# Patient Record
Sex: MALE | Race: WHITE | NOT HISPANIC OR LATINO | Employment: FULL TIME | URBAN - METROPOLITAN AREA
[De-identification: names, ages, dates, MRNs, and addresses within clinical notes are randomized per-mention and may not be internally consistent; named-entity substitution may affect disease eponyms.]

---

## 2021-05-24 RX ORDER — FLUTICASONE PROPIONATE 50 MCG
2 SPRAY, SUSPENSION (ML) NASAL DAILY
COMMUNITY
Start: 2015-05-04 | End: 2021-05-25 | Stop reason: ALTCHOICE

## 2021-05-24 RX ORDER — ALBUTEROL SULFATE 90 UG/1
AEROSOL, METERED RESPIRATORY (INHALATION)
COMMUNITY
Start: 2014-12-06

## 2021-05-25 ENCOUNTER — OFFICE VISIT (OUTPATIENT)
Dept: FAMILY MEDICINE CLINIC | Facility: CLINIC | Age: 36
End: 2021-05-25
Payer: COMMERCIAL

## 2021-05-25 VITALS
BODY MASS INDEX: 26.95 KG/M2 | HEIGHT: 72 IN | HEART RATE: 64 BPM | DIASTOLIC BLOOD PRESSURE: 64 MMHG | RESPIRATION RATE: 16 BRPM | WEIGHT: 199 LBS | OXYGEN SATURATION: 99 % | SYSTOLIC BLOOD PRESSURE: 122 MMHG

## 2021-05-25 DIAGNOSIS — Z13.6 SCREENING FOR CARDIOVASCULAR, RESPIRATORY, AND GENITOURINARY DISEASES: ICD-10-CM

## 2021-05-25 DIAGNOSIS — Z13.89 SCREENING FOR CARDIOVASCULAR, RESPIRATORY, AND GENITOURINARY DISEASES: ICD-10-CM

## 2021-05-25 DIAGNOSIS — E53.8 B12 DEFICIENCY: ICD-10-CM

## 2021-05-25 DIAGNOSIS — Z00.00 WELL ADULT EXAM: Primary | ICD-10-CM

## 2021-05-25 DIAGNOSIS — E55.9 VITAMIN D DEFICIENCY: ICD-10-CM

## 2021-05-25 DIAGNOSIS — J45.20 MILD INTERMITTENT ASTHMA WITHOUT COMPLICATION: ICD-10-CM

## 2021-05-25 DIAGNOSIS — Z83.2 FAMILY HISTORY OF AUTOIMMUNE DISORDER: ICD-10-CM

## 2021-05-25 DIAGNOSIS — Z11.4 SCREENING FOR HIV (HUMAN IMMUNODEFICIENCY VIRUS): ICD-10-CM

## 2021-05-25 DIAGNOSIS — Z13.29 SCREENING FOR THYROID DISORDER: ICD-10-CM

## 2021-05-25 DIAGNOSIS — Z13.83 SCREENING FOR CARDIOVASCULAR, RESPIRATORY, AND GENITOURINARY DISEASES: ICD-10-CM

## 2021-05-25 DIAGNOSIS — Z79.899 OTHER LONG TERM (CURRENT) DRUG THERAPY: ICD-10-CM

## 2021-05-25 PROCEDURE — 3008F BODY MASS INDEX DOCD: CPT | Performed by: FAMILY MEDICINE

## 2021-05-25 PROCEDURE — 3725F SCREEN DEPRESSION PERFORMED: CPT | Performed by: FAMILY MEDICINE

## 2021-05-25 PROCEDURE — 1036F TOBACCO NON-USER: CPT | Performed by: FAMILY MEDICINE

## 2021-05-25 PROCEDURE — 99395 PREV VISIT EST AGE 18-39: CPT | Performed by: FAMILY MEDICINE

## 2021-05-25 NOTE — PROGRESS NOTES
Assessment/Plan:  Healthy male   Brother with crohns and AS   He feels healthy     1  Well adult exam  -     Lipid panel; Future; Expected date: 05/25/2021  -     Comprehensive metabolic panel; Future; Expected date: 05/25/2021  -     CBC; Future; Expected date: 05/25/2021  -     UA w Reflex to Microscopic w Reflex to Culture; Future; Expected date: 05/25/2021    2  Vitamin D deficiency  -     Vitamin D 25 hydroxy; Future; Expected date: 05/25/2021    3  B12 deficiency  -     Vitamin B12; Future; Expected date: 05/25/2021    4  Other long term (current) drug therapy  -     Hemoglobin A1C; Future; Expected date: 05/25/2021    5  Screening for cardiovascular, respiratory, and genitourinary diseases  -     UA w Reflex to Microscopic w Reflex to Culture; Future; Expected date: 05/25/2021    6  Screening for thyroid disorder  -     TSH, 3rd generation with Free T4 reflex; Future; Expected date: 05/25/2021    7  Screening for HIV (human immunodeficiency virus)  -     HIV 1/2 Antigen/Antibody (4th Generation) w Reflex [facility]; Future    8  Mild intermittent asthma without complication    9  Family history of autoimmune disorder  -     NIRMAL Screen w/ Reflex to Titer/Pattern; Future    10  BMI 26 0-26 9,adult         Subjective:      Patient ID: Davis Ramires is a 37 y o  male  HPI   Here for yearly physical       The following portions of the patient's history were reviewed and updated as appropriate: allergies, current medications, past family history, past medical history, past social history, past surgical history and problem list     Review of Systems   Constitutional: Negative for activity change, appetite change and fever  HENT: Negative for congestion, nosebleeds and trouble swallowing  Eyes: Negative for itching  Respiratory: Negative for cough and chest tightness  Cardiovascular: Negative for chest pain and palpitations     Gastrointestinal: Negative for abdominal pain, constipation, diarrhea and nausea  Endocrine: Negative for cold intolerance  Genitourinary: Negative for frequency  Musculoskeletal: Negative for gait problem and joint swelling  Skin: Negative for rash  Allergic/Immunologic: Negative for immunocompromised state  Neurological: Negative for dizziness, tremors, seizures, syncope and headaches  Psychiatric/Behavioral: Negative for hallucinations and suicidal ideas  Objective:      /64 (BP Location: Right arm, Patient Position: Sitting, Cuff Size: Large)   Pulse 64   Resp 16   Ht 6' (1 829 m)   Wt 90 3 kg (199 lb)   SpO2 99%   BMI 26 99 kg/m²     No visits with results within 2 Week(s) from this visit  Latest known visit with results is:   Lab Conversion - Encounter on 07/14/2014   Component Date Value    Chlamydia Trachomatis By* 07/10/2014 NOT DETECTED     GC BY [facility]  METHOD (HISTO* 07/10/2014 NOT DETECTED           Physical Exam  Vitals signs and nursing note reviewed  Constitutional:       General: He is not in acute distress  Appearance: He is well-developed  HENT:      Head: Normocephalic and atraumatic  Neck:      Musculoskeletal: Normal range of motion and neck supple  Cardiovascular:      Rate and Rhythm: Normal rate and regular rhythm  Heart sounds: Normal heart sounds  No murmur  Pulmonary:      Effort: Pulmonary effort is normal       Breath sounds: Normal breath sounds  No wheezing or rales  Abdominal:      General: Bowel sounds are normal  There is no distension  Palpations: Abdomen is soft  Tenderness: There is no abdominal tenderness  There is no guarding or rebound  Musculoskeletal: Normal range of motion  General: No tenderness  Lymphadenopathy:      Cervical: No cervical adenopathy  Skin:     General: Skin is warm and dry  Capillary Refill: Capillary refill takes less than 2 seconds  Findings: No rash     Neurological:      Mental Status: He is alert and oriented to person, place, and time       Cranial Nerves: No cranial nerve deficit  Sensory: No sensory deficit  Motor: No abnormal muscle tone  Psychiatric:         Behavior: Behavior normal          Thought Content: Thought content normal          Judgment: Judgment normal          BMI Counseling: Body mass index is 26 99 kg/m²  The BMI is above normal  Nutrition recommendations include decreasing portion sizes, encouraging healthy choices of fruits and vegetables and limiting drinks that contain sugar  Exercise recommendations include moderate physical activity 150 minutes/week  No pharmacotherapy was ordered             Vickie Hyatt MD  [place] [id number]

## 2021-09-20 ENCOUNTER — APPOINTMENT (OUTPATIENT)
Dept: LAB | Facility: CLINIC | Age: 36
End: 2021-09-20
Payer: COMMERCIAL

## 2021-09-20 DIAGNOSIS — Z13.89 SCREENING FOR CARDIOVASCULAR, RESPIRATORY, AND GENITOURINARY DISEASES: ICD-10-CM

## 2021-09-20 DIAGNOSIS — E55.9 VITAMIN D DEFICIENCY: ICD-10-CM

## 2021-09-20 DIAGNOSIS — Z83.2 FAMILY HISTORY OF AUTOIMMUNE DISORDER: ICD-10-CM

## 2021-09-20 DIAGNOSIS — Z13.83 SCREENING FOR CARDIOVASCULAR, RESPIRATORY, AND GENITOURINARY DISEASES: ICD-10-CM

## 2021-09-20 DIAGNOSIS — Z11.4 SCREENING FOR HIV (HUMAN IMMUNODEFICIENCY VIRUS): ICD-10-CM

## 2021-09-20 DIAGNOSIS — Z13.6 SCREENING FOR CARDIOVASCULAR, RESPIRATORY, AND GENITOURINARY DISEASES: ICD-10-CM

## 2021-09-20 DIAGNOSIS — Z79.899 OTHER LONG TERM (CURRENT) DRUG THERAPY: ICD-10-CM

## 2021-09-20 DIAGNOSIS — Z13.29 SCREENING FOR THYROID DISORDER: ICD-10-CM

## 2021-09-20 DIAGNOSIS — E53.8 B12 DEFICIENCY: ICD-10-CM

## 2021-09-20 DIAGNOSIS — Z00.00 WELL ADULT EXAM: ICD-10-CM

## 2021-09-20 LAB
25(OH)D3 SERPL-MCNC: 33 NG/ML (ref 30–100)
ALBUMIN SERPL BCP-MCNC: 4.7 G/DL (ref 3.5–5)
ALP SERPL-CCNC: 45 U/L (ref 46–116)
ALT SERPL W P-5'-P-CCNC: 25 U/L (ref 12–78)
ANION GAP SERPL CALCULATED.3IONS-SCNC: 11 MMOL/L (ref 4–13)
AST SERPL W P-5'-P-CCNC: 17 U/L (ref 5–45)
BILIRUB SERPL-MCNC: 0.55 MG/DL (ref 0.2–1)
BILIRUB UR QL STRIP: NEGATIVE
BUN SERPL-MCNC: 17 MG/DL (ref 5–25)
CALCIUM SERPL-MCNC: 8.9 MG/DL (ref 8.3–10.1)
CHLORIDE SERPL-SCNC: 104 MMOL/L (ref 100–108)
CHOLEST SERPL-MCNC: 180 MG/DL (ref 50–200)
CLARITY UR: CLEAR
CO2 SERPL-SCNC: 25 MMOL/L (ref 21–32)
COLOR UR: YELLOW
CREAT SERPL-MCNC: 1.03 MG/DL (ref 0.6–1.3)
ERYTHROCYTE [DISTWIDTH] IN BLOOD BY AUTOMATED COUNT: 12.3 % (ref 11.6–15.1)
EST. AVERAGE GLUCOSE BLD GHB EST-MCNC: 94 MG/DL
GFR SERPL CREATININE-BSD FRML MDRD: 95 ML/MIN/1.73SQ M
GLUCOSE P FAST SERPL-MCNC: 88 MG/DL (ref 65–99)
GLUCOSE UR STRIP-MCNC: NEGATIVE MG/DL
HBA1C MFR BLD: 4.9 %
HCT VFR BLD AUTO: 43.9 % (ref 36.5–49.3)
HDLC SERPL-MCNC: 58 MG/DL
HGB BLD-MCNC: 14.6 G/DL (ref 12–17)
HGB UR QL STRIP.AUTO: NEGATIVE
KETONES UR STRIP-MCNC: NEGATIVE MG/DL
LDLC SERPL CALC-MCNC: 109 MG/DL (ref 0–100)
LEUKOCYTE ESTERASE UR QL STRIP: NEGATIVE
MCH RBC QN AUTO: 28.6 PG (ref 26.8–34.3)
MCHC RBC AUTO-ENTMCNC: 33.3 G/DL (ref 31.4–37.4)
MCV RBC AUTO: 86 FL (ref 82–98)
NITRITE UR QL STRIP: NEGATIVE
NONHDLC SERPL-MCNC: 122 MG/DL
PH UR STRIP.AUTO: 6 [PH]
PLATELET # BLD AUTO: 235 THOUSANDS/UL (ref 149–390)
PMV BLD AUTO: 9.5 FL (ref 8.9–12.7)
POTASSIUM SERPL-SCNC: 4.6 MMOL/L (ref 3.5–5.3)
PROT SERPL-MCNC: 7.3 G/DL (ref 6.4–8.2)
PROT UR STRIP-MCNC: NEGATIVE MG/DL
RBC # BLD AUTO: 5.11 MILLION/UL (ref 3.88–5.62)
SODIUM SERPL-SCNC: 140 MMOL/L (ref 136–145)
SP GR UR STRIP.AUTO: 1.01 (ref 1–1.03)
TRIGL SERPL-MCNC: 64 MG/DL
TSH SERPL DL<=0.05 MIU/L-ACNC: 1.65 UIU/ML (ref 0.36–3.74)
UROBILINOGEN UR QL STRIP.AUTO: 0.2 E.U./DL
VIT B12 SERPL-MCNC: 471 PG/ML (ref 100–900)
WBC # BLD AUTO: 5.28 THOUSAND/UL (ref 4.31–10.16)

## 2021-09-20 PROCEDURE — 80061 LIPID PANEL: CPT

## 2021-09-20 PROCEDURE — 85027 COMPLETE CBC AUTOMATED: CPT

## 2021-09-20 PROCEDURE — 80053 COMPREHEN METABOLIC PANEL: CPT

## 2021-09-20 PROCEDURE — 82306 VITAMIN D 25 HYDROXY: CPT

## 2021-09-20 PROCEDURE — 84443 ASSAY THYROID STIM HORMONE: CPT

## 2021-09-20 PROCEDURE — 82607 VITAMIN B-12: CPT

## 2021-09-20 PROCEDURE — 81003 URINALYSIS AUTO W/O SCOPE: CPT

## 2021-09-20 PROCEDURE — 36415 COLL VENOUS BLD VENIPUNCTURE: CPT

## 2021-09-20 PROCEDURE — 87389 HIV-1 AG W/HIV-1&-2 AB AG IA: CPT

## 2021-09-20 PROCEDURE — 86038 ANTINUCLEAR ANTIBODIES: CPT

## 2021-09-20 PROCEDURE — 83036 HEMOGLOBIN GLYCOSYLATED A1C: CPT

## 2021-09-21 ENCOUNTER — TELEPHONE (OUTPATIENT)
Dept: FAMILY MEDICINE CLINIC | Facility: CLINIC | Age: 36
End: 2021-09-21

## 2021-09-21 LAB — HIV 1+2 AB+HIV1 P24 AG SERPL QL IA: NORMAL

## 2021-09-21 NOTE — TELEPHONE ENCOUNTER
Pt called is requesting an order for an Allergist  He said the allergy medication is not working anymore

## 2021-09-22 LAB — RYE IGE QN: NEGATIVE

## 2021-12-16 PROBLEM — J30.81 ALLERGIC RHINITIS DUE TO DOGS: Status: ACTIVE | Noted: 2021-12-16

## 2021-12-16 PROBLEM — H10.13 ALLERGIC CONJUNCTIVITIS OF BOTH EYES: Status: ACTIVE | Noted: 2021-12-16

## 2021-12-16 PROBLEM — J30.1 CHRONIC SEASONAL ALLERGIC RHINITIS DUE TO POLLEN: Status: ACTIVE | Noted: 2021-12-16

## 2021-12-16 PROBLEM — J30.89 ALLERGIC RHINITIS DUE TO MOLD: Status: ACTIVE | Noted: 2021-12-16

## 2022-05-25 ENCOUNTER — OFFICE VISIT (OUTPATIENT)
Dept: FAMILY MEDICINE CLINIC | Facility: CLINIC | Age: 37
End: 2022-05-25
Payer: COMMERCIAL

## 2022-05-25 VITALS
OXYGEN SATURATION: 98 % | SYSTOLIC BLOOD PRESSURE: 126 MMHG | DIASTOLIC BLOOD PRESSURE: 74 MMHG | RESPIRATION RATE: 16 BRPM | WEIGHT: 204 LBS | HEART RATE: 100 BPM | HEIGHT: 72 IN | BODY MASS INDEX: 27.63 KG/M2

## 2022-05-25 DIAGNOSIS — Z00.00 WELL ADULT EXAM: Primary | ICD-10-CM

## 2022-05-25 DIAGNOSIS — T78.40XD ALLERGY, SUBSEQUENT ENCOUNTER: ICD-10-CM

## 2022-05-25 PROCEDURE — 3725F SCREEN DEPRESSION PERFORMED: CPT | Performed by: FAMILY MEDICINE

## 2022-05-25 PROCEDURE — 1036F TOBACCO NON-USER: CPT | Performed by: FAMILY MEDICINE

## 2022-05-25 PROCEDURE — 99395 PREV VISIT EST AGE 18-39: CPT | Performed by: FAMILY MEDICINE

## 2022-05-25 PROCEDURE — 3008F BODY MASS INDEX DOCD: CPT | Performed by: FAMILY MEDICINE

## 2022-05-25 NOTE — PROGRESS NOTES
Assessment/Plan:    1  Well adult exam    2  Allergy, subsequent encounter    3  BMI 27 0-27 9,adult     Things are going well  Ill see you in year ! Subjective:      Patient ID: Mackenzie Hess is a 31 y o  male  HPI     Here for yearly   Works out 3-5 times a week   Eats poorly   Eating kids dinner too   After dinner snacks     Allergy shots - from [employer]   Just allergy eyes     congetion is better      Brother with Londa Blizzard is good       The following portions of the patient's history were reviewed and updated as appropriate: allergies, current medications, past family history, past medical history, past social history, past surgical history and problem list     Review of Systems   Constitutional: Negative for activity change, appetite change and fever  HENT: Negative for congestion, nosebleeds and trouble swallowing  Eyes: Negative for itching  Respiratory: Negative for cough and chest tightness  Cardiovascular: Negative for chest pain and palpitations  Gastrointestinal: Negative for abdominal pain, constipation, diarrhea and nausea  Endocrine: Negative for cold intolerance  Genitourinary: Negative for frequency  Musculoskeletal: Negative for gait problem and joint swelling  Skin: Negative for rash  Allergic/Immunologic: Negative for immunocompromised state  Neurological: Negative for dizziness, tremors, seizures, syncope and headaches  Psychiatric/Behavioral: Negative for hallucinations and suicidal ideas  Objective:      /74 (BP Location: Right arm, Patient Position: Sitting, Cuff Size: Large)   Pulse 100   Resp 16   Ht 6' (1 829 m)   Wt 92 5 kg (204 lb)   SpO2 98%   BMI 27 67 kg/m²     No visits with results within 2 Week(s) from this visit     Latest known visit with results is:   Appointment on 09/20/2021   Component Date Value    HIV-1/HIV-2 Ab 09/20/2021 Non-Reactive     TSH 3RD GENERATON 09/20/2021 1 652     Vit D, 25-Hydroxy 09/20/2021 33 0  Cholesterol 09/20/2021 180     Triglycerides 09/20/2021 64     HDL, Direct 09/20/2021 58     LDL Calculated 09/20/2021 109 (A)    Non-HDL-Chol (CHOL-HDL) 09/20/2021 122     Sodium 09/20/2021 140     Potassium 09/20/2021 4 6     Chloride 09/20/2021 104     CO2 09/20/2021 25     ANION GAP 09/20/2021 11     BUN 09/20/2021 17     Creatinine 09/20/2021 1 03     Glucose, Fasting 09/20/2021 88     Calcium 09/20/2021 8 9     AST 09/20/2021 17     ALT 09/20/2021 25     Alkaline Phosphatase 09/20/2021 45 (A)    Total Protein 09/20/2021 7 3     Albumin 09/20/2021 4 7     Total Bilirubin 09/20/2021 0 55     eGFR 09/20/2021 95     WBC 09/20/2021 5 28     RBC 09/20/2021 5 11     Hemoglobin 09/20/2021 14 6     Hematocrit 09/20/2021 43 9     MCV 09/20/2021 86     MCH 09/20/2021 28 6     MCHC 09/20/2021 33 3     RDW 09/20/2021 12 3     Platelets 65/86/4793 235     MPV 09/20/2021 9 5     Vitamin B-12 09/20/2021 471     Color, UA 09/20/2021 Yellow     Clarity, UA 09/20/2021 Clear     Specific Gravity, UA 09/20/2021 1 010     pH, UA 09/20/2021 6 0     Leukocytes, UA 09/20/2021 Negative     Nitrite, UA 09/20/2021 Negative     Protein, UA 09/20/2021 Negative     Glucose, UA 09/20/2021 Negative     Ketones, UA 09/20/2021 Negative     Urobilinogen, UA 09/20/2021 0 2     Bilirubin, UA 09/20/2021 Negative     Blood, UA 09/20/2021 Negative     Hemoglobin A1C 09/20/2021 4 9     EAG 09/20/2021 94     NIRMAL 09/20/2021 Negative           Physical Exam  Vitals and nursing note reviewed  Constitutional:       General: He is not in acute distress  Appearance: He is well-developed  HENT:      Head: Normocephalic and atraumatic  Cardiovascular:      Rate and Rhythm: Normal rate and regular rhythm  Heart sounds: Normal heart sounds  No murmur heard  Pulmonary:      Effort: Pulmonary effort is normal       Breath sounds: Normal breath sounds  No wheezing or rales     Abdominal: General: Bowel sounds are normal  There is no distension  Palpations: Abdomen is soft  Tenderness: There is no abdominal tenderness  There is no guarding or rebound  Musculoskeletal:         General: No tenderness  Normal range of motion  Cervical back: Normal range of motion and neck supple  Lymphadenopathy:      Cervical: No cervical adenopathy  Skin:     General: Skin is warm and dry  Capillary Refill: Capillary refill takes less than 2 seconds  Findings: No rash  Neurological:      Mental Status: He is alert and oriented to person, place, and time  Cranial Nerves: No cranial nerve deficit  Sensory: No sensory deficit  Motor: No abnormal muscle tone  Psychiatric:         Behavior: Behavior normal          Thought Content:  Thought content normal          Judgment: Judgment normal              MD Deshawn BenitezPatricia [place] [id number]

## 2022-08-04 RX ORDER — NEOMYCIN SULFATE, POLYMYXIN B SULFATE AND HYDROCORTISONE 10; 3.5; 1 MG/ML; MG/ML; [USP'U]/ML
SUSPENSION/ DROPS AURICULAR (OTIC)
COMMUNITY
Start: 2022-07-24 | End: 2023-06-05

## 2022-08-04 RX ORDER — AMOXICILLIN AND CLAVULANATE POTASSIUM 875; 125 MG/1; MG/1
TABLET, FILM COATED ORAL
COMMUNITY
Start: 2022-07-24 | End: 2023-06-02 | Stop reason: ALTCHOICE

## 2022-08-05 ENCOUNTER — OFFICE VISIT (OUTPATIENT)
Dept: FAMILY MEDICINE CLINIC | Facility: CLINIC | Age: 37
End: 2022-08-05
Payer: COMMERCIAL

## 2022-08-05 VITALS
OXYGEN SATURATION: 97 % | DIASTOLIC BLOOD PRESSURE: 76 MMHG | SYSTOLIC BLOOD PRESSURE: 118 MMHG | RESPIRATION RATE: 16 BRPM | HEIGHT: 72 IN | HEART RATE: 89 BPM | BODY MASS INDEX: 28.31 KG/M2 | WEIGHT: 209 LBS

## 2022-08-05 DIAGNOSIS — H92.03 OTALGIA OF BOTH EARS: Primary | ICD-10-CM

## 2022-08-05 PROCEDURE — 99213 OFFICE O/P EST LOW 20 MIN: CPT | Performed by: FAMILY MEDICINE

## 2022-08-05 NOTE — PROGRESS NOTES
Assessment/Plan:    Doron España its just lingering   No infection   So no more abx   But take sudafed and flonase daily so it doesn't come back       1  Otalgia of both ears       Subjective:      Patient ID: Sonja Carlisle is a 31 y o  male  HPI   Left ear with aom   Given drops and orals  Finished couple days ago   Now with lingering feelings   Just here to recheck       The following portions of the patient's history were reviewed and updated as appropriate: allergies, current medications, past family history, past medical history, past social history, past surgical history and problem list     Review of Systems   Constitutional: Negative for activity change, appetite change and fever  HENT: Positive for ear pain  Negative for congestion, nosebleeds and trouble swallowing  Eyes: Negative for itching  Respiratory: Negative for cough and chest tightness  Cardiovascular: Negative for chest pain and palpitations  Gastrointestinal: Negative for abdominal pain, constipation, diarrhea and nausea  Endocrine: Negative for cold intolerance  Genitourinary: Negative for frequency  Musculoskeletal: Negative for gait problem and joint swelling  Skin: Negative for rash  Allergic/Immunologic: Negative for immunocompromised state  Neurological: Negative for dizziness, tremors, seizures, syncope and headaches  Psychiatric/Behavioral: Negative for hallucinations and suicidal ideas  Objective:      /76 (BP Location: Left arm, Patient Position: Sitting, Cuff Size: Large)   Pulse 89   Resp 16   Ht 6' (1 829 m)   Wt 94 8 kg (209 lb)   SpO2 97%   BMI 28 35 kg/m²     No visits with results within 2 Week(s) from this visit     Latest known visit with results is:   Appointment on 09/20/2021   Component Date Value    HIV-1/HIV-2 Ab 09/20/2021 Non-Reactive     TSH 3RD GENERATON 09/20/2021 1 652     Vit D, 25-Hydroxy 09/20/2021 33 0     Cholesterol 09/20/2021 180     Triglycerides 09/20/2021 64  HDL, Direct 09/20/2021 58     LDL Calculated 09/20/2021 109 (A)    Non-HDL-Chol (CHOL-HDL) 09/20/2021 122     Sodium 09/20/2021 140     Potassium 09/20/2021 4 6     Chloride 09/20/2021 104     CO2 09/20/2021 25     ANION GAP 09/20/2021 11     BUN 09/20/2021 17     Creatinine 09/20/2021 1 03     Glucose, Fasting 09/20/2021 88     Calcium 09/20/2021 8 9     AST 09/20/2021 17     ALT 09/20/2021 25     Alkaline Phosphatase 09/20/2021 45 (A)    Total Protein 09/20/2021 7 3     Albumin 09/20/2021 4 7     Total Bilirubin 09/20/2021 0 55     eGFR 09/20/2021 95     WBC 09/20/2021 5 28     RBC 09/20/2021 5 11     Hemoglobin 09/20/2021 14 6     Hematocrit 09/20/2021 43 9     MCV 09/20/2021 86     MCH 09/20/2021 28 6     MCHC 09/20/2021 33 3     RDW 09/20/2021 12 3     Platelets 52/70/8201 235     MPV 09/20/2021 9 5     Vitamin B-12 09/20/2021 471     Color, UA 09/20/2021 Yellow     Clarity, UA 09/20/2021 Clear     Specific Gravity, UA 09/20/2021 1 010     pH, UA 09/20/2021 6 0     Leukocytes, UA 09/20/2021 Negative     Nitrite, UA 09/20/2021 Negative     Protein, UA 09/20/2021 Negative     Glucose, UA 09/20/2021 Negative     Ketones, UA 09/20/2021 Negative     Urobilinogen, UA 09/20/2021 0 2     Bilirubin, UA 09/20/2021 Negative     Occult Blood, UA 09/20/2021 Negative     Hemoglobin A1C 09/20/2021 4 9     EAG 09/20/2021 94     NIRMAL 09/20/2021 Negative           Physical Exam  Vitals and nursing note reviewed  Constitutional:       General: He is not in acute distress  Appearance: He is well-developed  HENT:      Head: Normocephalic and atraumatic  Cardiovascular:      Rate and Rhythm: Normal rate and regular rhythm  Heart sounds: Normal heart sounds  No murmur heard  Pulmonary:      Effort: Pulmonary effort is normal       Breath sounds: Normal breath sounds  No wheezing or rales  Abdominal:      General: Bowel sounds are normal  There is no distension  Palpations: Abdomen is soft  Tenderness: There is no abdominal tenderness  There is no guarding or rebound  Musculoskeletal:         General: No tenderness  Normal range of motion  Cervical back: Normal range of motion and neck supple  Lymphadenopathy:      Cervical: No cervical adenopathy  Skin:     General: Skin is warm and dry  Capillary Refill: Capillary refill takes less than 2 seconds  Findings: No rash  Neurological:      Mental Status: He is alert and oriented to person, place, and time  Cranial Nerves: No cranial nerve deficit  Sensory: No sensory deficit  Motor: No abnormal muscle tone  Psychiatric:         Behavior: Behavior normal          Thought Content:  Thought content normal          Judgment: Judgment normal              Leidy Cooper MD  [place] [id number]

## 2022-10-12 PROBLEM — H10.13 ALLERGIC CONJUNCTIVITIS OF BOTH EYES: Status: RESOLVED | Noted: 2021-12-16 | Resolved: 2022-10-12

## 2023-06-05 ENCOUNTER — OFFICE VISIT (OUTPATIENT)
Dept: FAMILY MEDICINE CLINIC | Facility: CLINIC | Age: 38
End: 2023-06-05
Payer: COMMERCIAL

## 2023-06-05 VITALS
OXYGEN SATURATION: 98 % | BODY MASS INDEX: 28.04 KG/M2 | WEIGHT: 207 LBS | RESPIRATION RATE: 16 BRPM | HEIGHT: 72 IN | DIASTOLIC BLOOD PRESSURE: 76 MMHG | SYSTOLIC BLOOD PRESSURE: 120 MMHG | HEART RATE: 70 BPM

## 2023-06-05 DIAGNOSIS — Z00.00 WELL ADULT EXAM: Primary | ICD-10-CM

## 2023-06-05 DIAGNOSIS — E78.2 MIXED HYPERLIPIDEMIA: ICD-10-CM

## 2023-06-05 DIAGNOSIS — E55.9 VITAMIN D DEFICIENCY: ICD-10-CM

## 2023-06-05 DIAGNOSIS — J45.20 MILD INTERMITTENT ASTHMA WITHOUT COMPLICATION: ICD-10-CM

## 2023-06-05 DIAGNOSIS — E53.8 B12 DEFICIENCY: ICD-10-CM

## 2023-06-05 PROCEDURE — 99395 PREV VISIT EST AGE 18-39: CPT | Performed by: FAMILY MEDICINE

## 2023-06-05 RX ORDER — ALBUTEROL SULFATE 90 UG/1
2 AEROSOL, METERED RESPIRATORY (INHALATION) 4 TIMES DAILY
Qty: 18 G | Refills: 1 | Status: SHIPPED | OUTPATIENT
Start: 2023-06-05

## 2023-06-05 NOTE — PROGRESS NOTES
Assessment/Plan: Spent a lot of the time speaking more about diet exercise related weight loss and healthy lifestyle choices      1  Well adult exam  -     Lipid Panel with Direct LDL reflex; Future  -     Vitamin D 25 hydroxy; Future  -     Vitamin B12; Future  -     CBC and differential; Future  -     Comprehensive metabolic panel; Future    2  Mild intermittent asthma without complication  -     albuterol (PROVENTIL HFA,VENTOLIN HFA) 90 mcg/act inhaler; Inhale 2 puffs 4 (four) times a day    3  BMI 28 0-28 9,adult    4  Mixed hyperlipidemia  -     Lipid Panel with Direct LDL reflex; Future    5  Vitamin D deficiency  -     Vitamin D 25 hydroxy; Future    6  B12 deficiency  -     Vitamin B12; Future         Subjective:      Patient ID: Briana Vasques is a 30 y o  male  HPI   Here to go over chronic issues and labs / imaging studies if applicable  and yearly      Exercise 3-4 times a week   And eats [place] unheathly may be larger portion sizes    The following portions of the patient's history were reviewed and updated as appropriate: allergies, current medications, past family history, past medical history, past social history, past surgical history and problem list     Review of Systems   Constitutional: Negative for activity change, appetite change and fever  HENT: Negative for congestion, nosebleeds and trouble swallowing  Eyes: Negative for itching  Respiratory: Negative for cough and chest tightness  Cardiovascular: Negative for chest pain and palpitations  Gastrointestinal: Negative for abdominal pain, constipation, diarrhea and nausea  Endocrine: Negative for cold intolerance  Genitourinary: Negative for frequency  Musculoskeletal: Negative for gait problem and joint swelling  Skin: Negative for rash  Allergic/Immunologic: Negative for immunocompromised state  Neurological: Negative for dizziness, tremors, seizures, syncope and headaches     Psychiatric/Behavioral: Negative for hallucinations and suicidal ideas  Objective:      /76 (BP Location: Right arm, Patient Position: Sitting, Cuff Size: Standard)   Pulse 70   Resp 16   Ht 6' (1 829 m)   Wt 93 9 kg (207 lb)   SpO2 98%   BMI 28 07 kg/m²     No visits with results within 2 Week(s) from this visit     Latest known visit with results is:   Appointment on 09/20/2021   Component Date Value   • HIV-1/HIV-2 Ab 09/20/2021 Non-Reactive    • TSH 3RD GENERATON 09/20/2021 1 652    • Vit D, 25-Hydroxy 09/20/2021 33 0    • Cholesterol 09/20/2021 180    • Triglycerides 09/20/2021 64    • HDL, Direct 09/20/2021 58    • LDL Calculated 09/20/2021 109 (H)    • Non-HDL-Chol (CHOL-HDL) 09/20/2021 122    • Sodium 09/20/2021 140    • Potassium 09/20/2021 4 6    • Chloride 09/20/2021 104    • CO2 09/20/2021 25    • ANION GAP 09/20/2021 11    • BUN 09/20/2021 17    • Creatinine 09/20/2021 1 03    • Glucose, Fasting 09/20/2021 88    • Calcium 09/20/2021 8 9    • AST 09/20/2021 17    • ALT 09/20/2021 25    • Alkaline Phosphatase 09/20/2021 45 (L)    • Total Protein 09/20/2021 7 3    • Albumin 09/20/2021 4 7    • Total Bilirubin 09/20/2021 0 55    • eGFR 09/20/2021 95    • WBC 09/20/2021 5 28    • RBC 09/20/2021 5 11    • Hemoglobin 09/20/2021 14 6    • Hematocrit 09/20/2021 43 9    • MCV 09/20/2021 86    • MCH 09/20/2021 28 6    • MCHC 09/20/2021 33 3    • RDW 09/20/2021 12 3    • Platelets 09/63/2058 235    • MPV 09/20/2021 9 5    • Vitamin B-12 09/20/2021 471    • Color, UA 09/20/2021 Yellow    • Clarity, UA 09/20/2021 Clear    • Specific Gravity, UA 09/20/2021 1 010    • pH, UA 09/20/2021 6 0    • Leukocytes, UA 09/20/2021 Negative    • Nitrite, UA 09/20/2021 Negative    • Protein, UA 09/20/2021 Negative    • Glucose, UA 09/20/2021 Negative    • Ketones, UA 09/20/2021 Negative    • Urobilinogen, UA 09/20/2021 0 2    • Bilirubin, UA 09/20/2021 Negative    • Occult Blood, UA 09/20/2021 Negative    • Hemoglobin A1C 09/20/2021 4 9    • EAG 09/20/2021 94    • NIRMAL 09/20/2021 Negative           Physical Exam  Vitals and nursing note reviewed  Constitutional:       General: He is not in acute distress  Appearance: He is well-developed  HENT:      Head: Normocephalic and atraumatic  Cardiovascular:      Rate and Rhythm: Normal rate and regular rhythm  Heart sounds: Normal heart sounds  No murmur heard  Pulmonary:      Effort: Pulmonary effort is normal       Breath sounds: Normal breath sounds  No wheezing or rales  Abdominal:      General: Bowel sounds are normal  There is no distension  Palpations: Abdomen is soft  Tenderness: There is no abdominal tenderness  There is no guarding or rebound  Musculoskeletal:         General: No tenderness  Normal range of motion  Cervical back: Normal range of motion and neck supple  Lymphadenopathy:      Cervical: No cervical adenopathy  Skin:     General: Skin is warm and dry  Capillary Refill: Capillary refill takes less than 2 seconds  Findings: No rash  Neurological:      Mental Status: He is alert and oriented to person, place, and time  Cranial Nerves: No cranial nerve deficit  Sensory: No sensory deficit  Motor: No abnormal muscle tone  Psychiatric:         Behavior: Behavior normal          Thought Content: Thought content normal          Judgment: Judgment normal            BMI Counseling: Body mass index is 28 07 kg/m²  The BMI is above normal  Nutrition recommendations include decreasing portion sizes, encouraging healthy choices of fruits and vegetables, decreasing fast food intake, consuming healthier snacks and limiting drinks that contain sugar  Exercise recommendations include exercising 3-5 times per week  No pharmacotherapy was ordered  Rationale for BMI follow-up plan is due to patient being overweight or obese  Depression Screening and Follow-up Plan: Patient was screened for depression during today's encounter   They screened negative with a PHQ-2 score of 0       Tom Lizarraga MD  [place] [id number]

## 2023-07-24 ENCOUNTER — APPOINTMENT (OUTPATIENT)
Dept: LAB | Facility: AMBULARY SURGERY CENTER | Age: 38
End: 2023-07-24
Payer: COMMERCIAL

## 2023-07-25 ENCOUNTER — TELEPHONE (OUTPATIENT)
Dept: FAMILY MEDICINE CLINIC | Facility: CLINIC | Age: 38
End: 2023-07-25

## 2023-07-25 NOTE — TELEPHONE ENCOUNTER
----- Message from Brooklynn Ruano MD sent at 7/25/2023 12:32 PM EDT -----  electrolytes liver and kidneys are good   Blood count is too   White Blood cells are good  Rest is fine   Vitamin D level is good please take a multivitamin   B12 slightly low please take a multivitamin   cholesterol is much better now

## 2023-12-26 DIAGNOSIS — L70.9 ACNE, UNSPECIFIED ACNE TYPE: Primary | ICD-10-CM

## 2024-06-07 ENCOUNTER — OFFICE VISIT (OUTPATIENT)
Dept: FAMILY MEDICINE CLINIC | Facility: CLINIC | Age: 39
End: 2024-06-07

## 2024-06-07 VITALS
HEART RATE: 67 BPM | RESPIRATION RATE: 16 BRPM | HEIGHT: 72 IN | DIASTOLIC BLOOD PRESSURE: 70 MMHG | OXYGEN SATURATION: 97 % | SYSTOLIC BLOOD PRESSURE: 108 MMHG | BODY MASS INDEX: 28.31 KG/M2 | WEIGHT: 209 LBS

## 2024-06-07 DIAGNOSIS — E78.2 MIXED HYPERLIPIDEMIA: ICD-10-CM

## 2024-06-07 DIAGNOSIS — Z00.00 WELL ADULT EXAM: Primary | ICD-10-CM

## 2024-06-07 DIAGNOSIS — E53.8 B12 DEFICIENCY: ICD-10-CM

## 2024-06-07 DIAGNOSIS — E55.9 VITAMIN D DEFICIENCY: ICD-10-CM

## 2024-06-07 DIAGNOSIS — J45.20 MILD INTERMITTENT ASTHMA WITHOUT COMPLICATION: ICD-10-CM

## 2024-06-07 NOTE — PROGRESS NOTES
Ambulatory Visit  Name: Neo Vides      : 1985      MRN: [id number]  Encounter Provider: Sascha Abernathy MD  Encounter Date: 2024   Encounter department: [facility]    Assessment & Plan  1. Physical examination.  The patient was counseled to utilize a smart scale rossy to track his Body Mass Index (BMI). A comprehensive discussion was held regarding the potential benefits of tinctures and RSO. A blood work order was issued for the end of 2024. The patient was advised to commence a multivitamin regimen, specifically Lloyd hard tablets. Additionally, the patient was advised to perform stretching exercises to alleviate lower back tightness.    Follow-up  The patient is scheduled for a follow-up visit in 1 year, or earlier if necessary.  No orders of the defined types were placed in this encounter.     1. Well adult exam  -     Lipid Panel with Direct LDL reflex; Future; Expected date: 2024  -     CBC and differential; Future; Expected date: 2024  -     Comprehensive metabolic panel; Future; Expected date: 2024  -     Vitamin B12; Future; Expected date: 2024  -     Vitamin D 25 hydroxy; Future; Expected date: 2024  2. Mild intermittent asthma without complication  3. BMI 28.0-28.9,adult  4. Mixed hyperlipidemia  -     Lipid Panel with Direct LDL reflex; Future; Expected date: 2024  5. B12 deficiency  -     Vitamin B12; Future; Expected date: 2024  6. Vitamin D deficiency  -     Vitamin D 25 hydroxy; Future; Expected date: 2024        History of Present Illness     History of Present Illness  The patient is a 38-year-old male who is here for a physical exam.    The patient's current weight is 209 pounds, with a target weight of 200 pounds. His asthma and respiratory conditions are well-managed. However, he perceives his overall health as advancing age, with increased breathlessness during periods of weight fluctuations, ranging  from 200 to 210 pounds. He engages in intermittent fasting and reports an improvement in his breathing when consistent. He acknowledges feeling slightly more winded when ascending or descending stairs. Despite his efforts to maintain cardiovascular fitness through regular exercise, he experiences breathlessness during household chores. He has a history of smoking marijuana for the past 20 years, non-tobacco user, and inconsistent use of edibles, which he purchases from a medical dispensary. He has not yet tried tinctures. His last blood work was conducted in 07/2023. He does not take a multivitamin and maintains regular dental and eye examinations. He attempts to stretch for 15 to 20 minutes each morning to manage lower back tightness.   His mother has Alzheimer's.     Review of Systems   Constitutional:  Negative for activity change, appetite change and fever.   HENT:  Negative for congestion, nosebleeds and trouble swallowing.    Eyes:  Negative for itching.   Respiratory:  Negative for cough and chest tightness.    Cardiovascular:  Negative for chest pain and palpitations.   Gastrointestinal:  Negative for abdominal pain, constipation, diarrhea and nausea.   Endocrine: Negative for cold intolerance.   Genitourinary:  Negative for frequency.   Musculoskeletal:  Negative for gait problem and joint swelling.   Skin:  Negative for rash.   Allergic/Immunologic: Negative for immunocompromised state.   Neurological:  Negative for dizziness, tremors, seizures, syncope and headaches.   Psychiatric/Behavioral:  Negative for hallucinations and suicidal ideas.      Objective     /70   Pulse 67   Resp 16   Ht 6' (1.829 m)   Wt 94.8 kg (209 lb)   SpO2 97%   BMI 28.35 kg/m²     Physical Exam  Vital Signs  The patient's blood pressure is 108/70. The patient's weight is 209 pounds, with a BMI of 28.  Physical Exam  Vitals and nursing note reviewed.   Constitutional:       Appearance: He is well-developed.   HENT:       Head: Normocephalic and atraumatic.   Eyes:      Conjunctiva/sclera: Conjunctivae normal.      Pupils: Pupils are equal, round, and reactive to light.   Cardiovascular:      Rate and Rhythm: Normal rate and regular rhythm.      Heart sounds: Normal heart sounds.   Pulmonary:      Effort: Pulmonary effort is normal.      Breath sounds: Normal breath sounds. No wheezing or rales.   Abdominal:      General: Bowel sounds are normal. There is no distension.      Palpations: Abdomen is soft.      Tenderness: There is no abdominal tenderness.   Musculoskeletal:         General: No tenderness. Normal range of motion.      Cervical back: Normal range of motion and neck supple.   Skin:     General: Skin is warm and dry.      Findings: No rash.   Neurological:      Mental Status: He is alert and oriented to person, place, and time.      Cranial Nerves: No cranial nerve deficit.      Sensory: No sensory deficit.      Coordination: Coordination normal.   Psychiatric:         Behavior: Behavior normal.         Thought Content: Thought content normal.         Judgment: Judgment normal.     Administrative Statements

## 2024-06-13 ENCOUNTER — TELEPHONE (OUTPATIENT)
Dept: FAMILY MEDICINE CLINIC | Facility: CLINIC | Age: 39
End: 2024-06-13

## 2024-09-24 ENCOUNTER — APPOINTMENT (OUTPATIENT)
Dept: LAB | Facility: AMBULARY SURGERY CENTER | Age: 39
End: 2024-09-24
Payer: COMMERCIAL

## 2024-09-24 DIAGNOSIS — E53.8 B12 DEFICIENCY: ICD-10-CM

## 2024-09-24 DIAGNOSIS — Z00.00 WELL ADULT EXAM: ICD-10-CM

## 2024-09-24 DIAGNOSIS — E55.9 VITAMIN D DEFICIENCY: ICD-10-CM

## 2024-09-24 DIAGNOSIS — E78.2 MIXED HYPERLIPIDEMIA: ICD-10-CM

## 2024-09-24 LAB
25(OH)D3 SERPL-MCNC: 35.2 NG/ML (ref 30–100)
ALBUMIN SERPL BCG-MCNC: 4.9 G/DL (ref 3.5–5)
ALP SERPL-CCNC: 39 U/L (ref 34–104)
ALT SERPL W P-5'-P-CCNC: 17 U/L (ref 7–52)
ANION GAP SERPL CALCULATED.3IONS-SCNC: 7 MMOL/L (ref 4–13)
AST SERPL W P-5'-P-CCNC: 16 U/L (ref 13–39)
BASOPHILS # BLD AUTO: 0.04 THOUSANDS/ΜL (ref 0–0.1)
BASOPHILS NFR BLD AUTO: 1 % (ref 0–1)
BILIRUB SERPL-MCNC: 0.66 MG/DL (ref 0.2–1)
BUN SERPL-MCNC: 13 MG/DL (ref 5–25)
CALCIUM SERPL-MCNC: 9.6 MG/DL (ref 8.4–10.2)
CHLORIDE SERPL-SCNC: 102 MMOL/L (ref 96–108)
CHOLEST SERPL-MCNC: 179 MG/DL
CO2 SERPL-SCNC: 31 MMOL/L (ref 21–32)
CREAT SERPL-MCNC: 1.04 MG/DL (ref 0.6–1.3)
EOSINOPHIL # BLD AUTO: 0.2 THOUSAND/ΜL (ref 0–0.61)
EOSINOPHIL NFR BLD AUTO: 5 % (ref 0–6)
ERYTHROCYTE [DISTWIDTH] IN BLOOD BY AUTOMATED COUNT: 12.3 % (ref 11.6–15.1)
GFR SERPL CREATININE-BSD FRML MDRD: 91 ML/MIN/1.73SQ M
GLUCOSE P FAST SERPL-MCNC: 92 MG/DL (ref 65–99)
HCT VFR BLD AUTO: 41.9 % (ref 36.5–49.3)
HDLC SERPL-MCNC: 46 MG/DL
HGB BLD-MCNC: 13.8 G/DL (ref 12–17)
IMM GRANULOCYTES # BLD AUTO: 0.01 THOUSAND/UL (ref 0–0.2)
IMM GRANULOCYTES NFR BLD AUTO: 0 % (ref 0–2)
LDLC SERPL CALC-MCNC: 118 MG/DL (ref 0–100)
LYMPHOCYTES # BLD AUTO: 1.78 THOUSANDS/ΜL (ref 0.6–4.47)
LYMPHOCYTES NFR BLD AUTO: 41 % (ref 14–44)
MCH RBC QN AUTO: 28.1 PG (ref 26.8–34.3)
MCHC RBC AUTO-ENTMCNC: 32.9 G/DL (ref 31.4–37.4)
MCV RBC AUTO: 85 FL (ref 82–98)
MONOCYTES # BLD AUTO: 0.26 THOUSAND/ΜL (ref 0.17–1.22)
MONOCYTES NFR BLD AUTO: 6 % (ref 4–12)
NEUTROPHILS # BLD AUTO: 2.08 THOUSANDS/ΜL (ref 1.85–7.62)
NEUTS SEG NFR BLD AUTO: 47 % (ref 43–75)
NRBC BLD AUTO-RTO: 0 /100 WBCS
PLATELET # BLD AUTO: 235 THOUSANDS/UL (ref 149–390)
PMV BLD AUTO: 9.7 FL (ref 8.9–12.7)
POTASSIUM SERPL-SCNC: 4.1 MMOL/L (ref 3.5–5.3)
PROT SERPL-MCNC: 7.2 G/DL (ref 6.4–8.4)
RBC # BLD AUTO: 4.91 MILLION/UL (ref 3.88–5.62)
SODIUM SERPL-SCNC: 140 MMOL/L (ref 135–147)
TRIGL SERPL-MCNC: 73 MG/DL
VIT B12 SERPL-MCNC: 306 PG/ML (ref 180–914)
WBC # BLD AUTO: 4.37 THOUSAND/UL (ref 4.31–10.16)

## 2024-09-24 PROCEDURE — 82607 VITAMIN B-12: CPT

## 2024-09-24 PROCEDURE — 82306 VITAMIN D 25 HYDROXY: CPT

## 2024-09-24 PROCEDURE — 85025 COMPLETE CBC W/AUTO DIFF WBC: CPT

## 2024-09-24 PROCEDURE — 36415 COLL VENOUS BLD VENIPUNCTURE: CPT

## 2024-09-24 PROCEDURE — 80061 LIPID PANEL: CPT

## 2024-09-24 PROCEDURE — 80053 COMPREHEN METABOLIC PANEL: CPT

## 2024-10-11 ENCOUNTER — HOSPITAL ENCOUNTER (EMERGENCY)
Facility: HOSPITAL | Age: 39
Discharge: HOME | End: 2024-10-11
Attending: EMERGENCY MEDICINE
Payer: COMMERCIAL

## 2024-10-11 ENCOUNTER — APPOINTMENT (EMERGENCY)
Dept: CT IMAGING | Facility: HOSPITAL | Age: 39
End: 2024-10-11
Payer: COMMERCIAL

## 2024-10-11 VITALS
DIASTOLIC BLOOD PRESSURE: 76 MMHG | OXYGEN SATURATION: 100 % | HEART RATE: 65 BPM | RESPIRATION RATE: 18 BRPM | TEMPERATURE: 97.5 F | SYSTOLIC BLOOD PRESSURE: 130 MMHG

## 2024-10-11 DIAGNOSIS — R10.84 GENERALIZED ABDOMINAL PAIN: ICD-10-CM

## 2024-10-11 DIAGNOSIS — K59.00 CONSTIPATION: Primary | ICD-10-CM

## 2024-10-11 DIAGNOSIS — R11.2 NAUSEA AND VOMITING: ICD-10-CM

## 2024-10-11 LAB
ALBUMIN SERPL BCG-MCNC: 5.3 G/DL (ref 3.5–5)
ALP SERPL-CCNC: 43 U/L (ref 34–104)
ALT SERPL W P-5'-P-CCNC: 15 U/L (ref 7–52)
ANION GAP SERPL CALCULATED.3IONS-SCNC: 10 MMOL/L (ref 4–13)
AST SERPL W P-5'-P-CCNC: 14 U/L (ref 13–39)
ATRIAL RATE: 57 BPM
BASOPHILS # BLD AUTO: 0.03 THOUSANDS/ΜL (ref 0–0.1)
BASOPHILS NFR BLD AUTO: 0 % (ref 0–1)
BILIRUB SERPL-MCNC: 0.8 MG/DL (ref 0.2–1)
BILIRUB UR QL STRIP: NEGATIVE
BUN SERPL-MCNC: 20 MG/DL (ref 5–25)
CALCIUM SERPL-MCNC: 10.3 MG/DL (ref 8.4–10.2)
CARDIAC TROPONIN I PNL SERPL HS: <2 NG/L
CHLORIDE SERPL-SCNC: 101 MMOL/L (ref 96–108)
CLARITY UR: CLEAR
CO2 SERPL-SCNC: 25 MMOL/L (ref 21–32)
COLOR UR: ABNORMAL
CREAT SERPL-MCNC: 1.17 MG/DL (ref 0.6–1.3)
EOSINOPHIL # BLD AUTO: 0.2 THOUSAND/ΜL (ref 0–0.61)
EOSINOPHIL NFR BLD AUTO: 2 % (ref 0–6)
ERYTHROCYTE [DISTWIDTH] IN BLOOD BY AUTOMATED COUNT: 12.1 % (ref 11.6–15.1)
GFR SERPL CREATININE-BSD FRML MDRD: 79 ML/MIN/1.73SQ M
GLUCOSE SERPL-MCNC: 106 MG/DL (ref 65–140)
GLUCOSE UR STRIP-MCNC: NEGATIVE MG/DL
HCT VFR BLD AUTO: 42.7 % (ref 36.5–49.3)
HGB BLD-MCNC: 14.5 G/DL (ref 12–17)
HGB UR QL STRIP.AUTO: NEGATIVE
IMM GRANULOCYTES # BLD AUTO: 0.02 THOUSAND/UL (ref 0–0.2)
IMM GRANULOCYTES NFR BLD AUTO: 0 % (ref 0–2)
KETONES UR STRIP-MCNC: ABNORMAL MG/DL
LACTATE SERPL-SCNC: 0.7 MMOL/L (ref 0.5–2)
LEUKOCYTE ESTERASE UR QL STRIP: NEGATIVE
LIPASE SERPL-CCNC: 29 U/L (ref 11–82)
LYMPHOCYTES # BLD AUTO: 2.56 THOUSANDS/ΜL (ref 0.6–4.47)
LYMPHOCYTES NFR BLD AUTO: 30 % (ref 14–44)
MCH RBC QN AUTO: 27.9 PG (ref 26.8–34.3)
MCHC RBC AUTO-ENTMCNC: 34 G/DL (ref 31.4–37.4)
MCV RBC AUTO: 82 FL (ref 82–98)
MONOCYTES # BLD AUTO: 0.38 THOUSAND/ΜL (ref 0.17–1.22)
MONOCYTES NFR BLD AUTO: 5 % (ref 4–12)
NEUTROPHILS # BLD AUTO: 5.33 THOUSANDS/ΜL (ref 1.85–7.62)
NEUTS SEG NFR BLD AUTO: 63 % (ref 43–75)
NITRITE UR QL STRIP: NEGATIVE
NRBC BLD AUTO-RTO: 0 /100 WBCS
P AXIS: -16 DEGREES
PH UR STRIP.AUTO: 6.5 [PH]
PLATELET # BLD AUTO: 287 THOUSANDS/UL (ref 149–390)
PMV BLD AUTO: 9.3 FL (ref 8.9–12.7)
POTASSIUM SERPL-SCNC: 4 MMOL/L (ref 3.5–5.3)
PR INTERVAL: 148 MS
PROT SERPL-MCNC: 7.9 G/DL (ref 6.4–8.4)
PROT UR STRIP-MCNC: NEGATIVE MG/DL
QRS AXIS: 26 DEGREES
QRSD INTERVAL: 80 MS
QT INTERVAL: 416 MS
QTC INTERVAL: 404 MS
RBC # BLD AUTO: 5.2 MILLION/UL (ref 3.88–5.62)
SODIUM SERPL-SCNC: 136 MMOL/L (ref 135–147)
SP GR UR STRIP.AUTO: >=1.05 (ref 1–1.03)
T WAVE AXIS: -1 DEGREES
UROBILINOGEN UR STRIP-ACNC: <2 MG/DL
VENTRICULAR RATE: 57 BPM
WBC # BLD AUTO: 8.52 THOUSAND/UL (ref 4.31–10.16)

## 2024-10-11 PROCEDURE — 74177 CT ABD & PELVIS W/CONTRAST: CPT

## 2024-10-11 PROCEDURE — 36415 COLL VENOUS BLD VENIPUNCTURE: CPT | Performed by: PHYSICIAN ASSISTANT

## 2024-10-11 PROCEDURE — 99284 EMERGENCY DEPT VISIT MOD MDM: CPT

## 2024-10-11 PROCEDURE — 93010 ELECTROCARDIOGRAM REPORT: CPT | Performed by: INTERNAL MEDICINE

## 2024-10-11 PROCEDURE — 96375 TX/PRO/DX INJ NEW DRUG ADDON: CPT

## 2024-10-11 PROCEDURE — 83605 ASSAY OF LACTIC ACID: CPT | Performed by: PHYSICIAN ASSISTANT

## 2024-10-11 PROCEDURE — 84484 ASSAY OF TROPONIN QUANT: CPT | Performed by: PHYSICIAN ASSISTANT

## 2024-10-11 PROCEDURE — 80053 COMPREHEN METABOLIC PANEL: CPT | Performed by: PHYSICIAN ASSISTANT

## 2024-10-11 PROCEDURE — 83690 ASSAY OF LIPASE: CPT | Performed by: PHYSICIAN ASSISTANT

## 2024-10-11 PROCEDURE — 96374 THER/PROPH/DIAG INJ IV PUSH: CPT

## 2024-10-11 PROCEDURE — 81003 URINALYSIS AUTO W/O SCOPE: CPT | Performed by: PHYSICIAN ASSISTANT

## 2024-10-11 PROCEDURE — 85025 COMPLETE CBC W/AUTO DIFF WBC: CPT | Performed by: PHYSICIAN ASSISTANT

## 2024-10-11 PROCEDURE — 99285 EMERGENCY DEPT VISIT HI MDM: CPT | Performed by: PHYSICIAN ASSISTANT

## 2024-10-11 PROCEDURE — 93005 ELECTROCARDIOGRAM TRACING: CPT

## 2024-10-11 PROCEDURE — 96361 HYDRATE IV INFUSION ADD-ON: CPT

## 2024-10-11 RX ORDER — MAGNESIUM CARB/ALUMINUM HYDROX 105-160MG
296 TABLET,CHEWABLE ORAL ONCE
Status: COMPLETED | OUTPATIENT
Start: 2024-10-11 | End: 2024-10-11

## 2024-10-11 RX ORDER — ACETAMINOPHEN 10 MG/ML
1000 INJECTION, SOLUTION INTRAVENOUS ONCE
Status: COMPLETED | OUTPATIENT
Start: 2024-10-11 | End: 2024-10-11

## 2024-10-11 RX ORDER — KETOROLAC TROMETHAMINE 30 MG/ML
15 INJECTION, SOLUTION INTRAMUSCULAR; INTRAVENOUS ONCE
Status: COMPLETED | OUTPATIENT
Start: 2024-10-11 | End: 2024-10-11

## 2024-10-11 RX ORDER — HYDROMORPHONE HCL IN WATER/PF 6 MG/30 ML
0.2 PATIENT CONTROLLED ANALGESIA SYRINGE INTRAVENOUS ONCE
Status: COMPLETED | OUTPATIENT
Start: 2024-10-11 | End: 2024-10-11

## 2024-10-11 RX ORDER — ONDANSETRON 2 MG/ML
4 INJECTION INTRAMUSCULAR; INTRAVENOUS ONCE
Status: COMPLETED | OUTPATIENT
Start: 2024-10-11 | End: 2024-10-11

## 2024-10-11 RX ORDER — ONDANSETRON 4 MG/1
4 TABLET, FILM COATED ORAL EVERY 8 HOURS PRN
Qty: 9 TABLET | Refills: 0 | Status: SHIPPED | OUTPATIENT
Start: 2024-10-11 | End: 2024-10-14

## 2024-10-11 RX ADMIN — KETOROLAC TROMETHAMINE 15 MG: 30 INJECTION, SOLUTION INTRAMUSCULAR; INTRAVENOUS at 04:22

## 2024-10-11 RX ADMIN — SODIUM CHLORIDE 1000 ML: 0.9 INJECTION, SOLUTION INTRAVENOUS at 04:21

## 2024-10-11 RX ADMIN — HYDROMORPHONE HYDROCHLORIDE 0.2 MG: 0.2 INJECTION, SOLUTION INTRAMUSCULAR; INTRAVENOUS; SUBCUTANEOUS at 04:22

## 2024-10-11 RX ADMIN — ACETAMINOPHEN 1000 MG: 10 INJECTION INTRAVENOUS at 04:22

## 2024-10-11 RX ADMIN — ONDANSETRON 4 MG: 2 INJECTION INTRAMUSCULAR; INTRAVENOUS at 04:22

## 2024-10-11 RX ADMIN — IOHEXOL 85 ML: 350 INJECTION, SOLUTION INTRAVENOUS at 05:50

## 2024-10-11 RX ADMIN — MAGNESIUM CITRATE 296 ML: 1.75 LIQUID ORAL at 08:00

## 2024-10-11 RX ADMIN — SODIUM CHLORIDE 1000 ML: 0.9 INJECTION, SOLUTION INTRAVENOUS at 04:23

## 2024-10-11 NOTE — DISCHARGE INSTRUCTIONS
Reading Physician Reading Date Result Priority   Simone Trammell MD  [phone] 10/11/2024      Narrative & Impression  CT ABDOMEN AND PELVIS WITH IV CONTRAST     INDICATION: upper abd left lower quadrant abdominal pain with nausea and vomiting. .     COMPARISON: None.     TECHNIQUE: CT examination of the abdomen and pelvis was performed. Multiplanar 2D reformatted images were created from the source data.     This examination, like all CT scans performed in the [facility], was performed utilizing techniques to minimize radiation dose exposure, including the use of iterative reconstruction and automated exposure control. Radiation dose length   product (DLP) for this visit: 686 mGy-cm     IV Contrast: 85 mL of iohexol (OMNIPAQUE)  Enteric Contrast: Not administered.     FINDINGS:     ABDOMEN     LOWER CHEST: No clinically significant abnormality in the visualized lower chest.     LIVER/BILIARY TREE: Unremarkable.     GALLBLADDER: No calcified gallstones. No pericholecystic inflammatory change.     SPLEEN: Unremarkable.     PANCREAS: Unremarkable.     ADRENAL GLANDS: Unremarkable.     KIDNEYS/URETERS: Unremarkable. No hydronephrosis.     STOMACH AND BOWEL: Unremarkable.     APPENDIX: No findings to suggest appendicitis.     ABDOMINOPELVIC CAVITY: No ascites. No pneumoperitoneum. No lymphadenopathy.     VESSELS: Unremarkable for patient's age.     PELVIS     REPRODUCTIVE ORGANS: Unremarkable for patient's age.     URINARY BLADDER: Unremarkable.     ABDOMINAL WALL/INGUINAL REGIONS: Unremarkable.     BONES: No acute fracture or suspicious osseous lesion.     IMPRESSION:     No acute findings in the abdomen or pelvis.        Workstation performed: [id number]

## 2024-10-11 NOTE — Clinical Note
Neo Vides was seen and treated in our emergency department on 10/11/2024.                Diagnosis:     Neo  may return to work on return date.    He may return on this date: 10/14/2024         If you have any questions or concerns, please don't hesitate to call.      Christ Villareal PA-C    ______________________________           _______________          _______________  Hospital Representative                              Date                                Time

## 2024-10-11 NOTE — ED PROVIDER NOTES
"Time reflects when diagnosis was documented in both MDM as applicable and the Disposition within this note       Time User Action Codes Description Comment    10/11/2024  6:24 AM Christ Villareal [K59.00] Constipation     10/11/2024  6:24 AM Christ Villareal [R11.2] Nausea and vomiting     10/11/2024  6:54 AM Christ Villareal [R10.84] Generalized abdominal pain           ED Disposition       ED Disposition   Discharge    Condition   Stable    Date/Time   Fri Oct 11, 2024  7:43 AM    Comment   Neo Vides discharge to home/self care.                   Assessment & Plan       Medical Decision Making  Patient is a 38-year-old male with no significant past medical or surgical history that presents to the emergency department with generalized aching persistent and worsening abdominal pain for the past several weeks, worsening over the past several days.   Patient hemodynamically stable and afebrile  No sirs  No leukocytosis, no bandemia, no lactic acidosis  Normal electrolytes, normal kidney function  Normal lipase, doubt acute pancreatitis  ECG with normal sinus rhythm, negative troponin, doubt ACS component  Urinalysis with +1 ketones, no evidence of UTI  CT abdomen pelvis with contrast with \"no acute findings in the abdomen or pelvis.\"    Delivered to modal pain control and antinausea in the emergency department; patient demonstrates decrease in presenting generalized abdominal pain and nausea ED symptomatology status post medication delivery  Ddx likely and not limited to SBO, ACS, acute pancreatitis, colitis, diverticulitis, constipation  Will treat for constipation nausea vomiting  Delivered mag citrate; with patient instructed on how to take, risks and benefits, and side effects and will take when he goes home; work note delivered  Patient states that he drinks lots of cups of coffee daily likely dehydration patient contributing to constipation symptomatology  Follow-up with PCP  Follow up with emergency " department if symptoms persist or exacerbate  Patient demonstrates verbal understanding of all clinical laboratory and imaging findings, discharge instructions, follow-up, and verbally agrees with current treatment plan with teach back    *Due to voice recognition software, sound alike and misspelled words may be contained in the documentation*    Amount and/or Complexity of Data Reviewed  Labs: ordered. Decision-making details documented in ED Course.  Radiology: ordered and independent interpretation performed. Decision-making details documented in ED Course.    Risk  OTC drugs.  Prescription drug management.        ED Course as of 10/11/24 0800   Fri Oct 11, 2024   0537 Labs look reassuring, pt hemodynamically stable, and pain/nausea controlled; pt to ct scan now.  Care ongoing.   0652 Wet read of CT abdomen pelvis with mild stool burden in abdomen synonymous with patient physical exam of abdominal pain according to where stool burden exists.  Will give mag citrate in the department and will have patient use mag citrate when he gets home as patient states that he lives approximately 5 minutes from the hospital.   0743 Ketones, UA(!): 10 (1+)       Medications   magnesium citrate (CITROMA) oral solution 296 mL (has no administration in time range)   sodium chloride 0.9 % bolus 1,000 mL (0 mL Intravenous Stopped 10/11/24 0523)   ondansetron (ZOFRAN) injection 4 mg (4 mg Intravenous Given 10/11/24 0422)   ketorolac (TORADOL) injection 15 mg (15 mg Intravenous Given 10/11/24 0422)   acetaminophen (Ofirmev) injection 1,000 mg (0 mg Intravenous Stopped 10/11/24 0437)   HYDROmorphone HCl (DILAUDID) injection 0.2 mg (0.2 mg Intravenous Given 10/11/24 0422)   sodium chloride 0.9 % bolus 1,000 mL (0 mL Intravenous Stopped 10/11/24 0521)   iohexol (OMNIPAQUE) 350 MG/ML injection (MULTI-DOSE) 85 mL (85 mL Intravenous Given 10/11/24 0550)       ED Risk Strat Scores                                               History of  "Present Illness       Chief Complaint   Patient presents with    Abdominal Pain     Pt reports severe abd pain he believes is due to constipation. Last normal BM yesterday, has been taking laxatives, stool softeners, and increasing water intake. Reports fighting stomach bug. +  nausea, + vomiting, + chills. + flatus. C/o lightheadedness in waiting room     Patient is a 38-year-old male with no significant past medical or surgical history that presents to the emergency department with generalized aching persistent and worsening abdominal pain for the past several weeks, worsening over the past several days.  Patient has associated symptomatology of nausea, vomiting, and chills beginning with the current ED presentation of generalized abdominal pain.  Patient does report having a small bowel movement yesterday, however denies history of abdominal surgeries.  Patient denies questionable dietary or idem intake.  Patient does report that recently some of his family members had \"stomach bug, but I do not think it is that.\"  Patient denies palliative factors with provocative factors of pressure to upper and lower abdomen.  Patient denies noneffective treatment.  Patient denies fevers diarrhea and urinary symptoms.  Patient denies testicular pain.  Patient does state that he had a bowel movement yesterday and considers that current abdominal pain symptoms may be related to constipation.  Patient denies sick contacts and recent travel.  Patient denies recent fall and recent trauma.  Patient denies chest pain and shortness of breath.    Past Medical History:   Diagnosis Date    Asthma     Eczema     History of chickenpox       Past Surgical History:   Procedure Laterality Date    CIRCUMCISION      WISDOM TOOTH EXTRACTION        Family History   Problem Relation Age of Onset    No Known Problems Mother     Hypertension Father     Crohn's disease Brother     No Known Problems Daughter       Social History     Tobacco Use    " Smoking status: Former     Current packs/day: 0.00     Types: Cigarettes     Quit date:      Years since quittin.7    Smokeless tobacco: Never    Tobacco comments:     social smoking-couple cigarettes a week    Vaping Use    Vaping status: Never Used   Substance Use Topics    Alcohol use: Yes     Comment: socially     Drug use: Yes     Types: Marijuana      E-Cigarette/Vaping    E-Cigarette Use Never User       E-Cigarette/Vaping Substances    Nicotine No     THC No     CBD No     Flavoring No     Other No     Unknown No       I have reviewed and agree with the history as documented.       History provided by:  Patient   used: No    Abdominal Pain  Associated symptoms: nausea and vomiting    Associated symptoms: no chest pain, no chills, no constipation, no cough, no diarrhea, no dysuria, no fever, no shortness of breath and no sore throat        Review of Systems   Constitutional:  Negative for activity change, appetite change, chills and fever.   HENT:  Negative for congestion, postnasal drip, rhinorrhea, sinus pressure, sinus pain, sore throat and tinnitus.    Eyes:  Negative for photophobia and visual disturbance.   Respiratory:  Negative for cough, chest tightness and shortness of breath.    Cardiovascular:  Negative for chest pain and palpitations.   Gastrointestinal:  Positive for abdominal pain, nausea and vomiting. Negative for constipation and diarrhea.   Genitourinary:  Negative for difficulty urinating, dysuria, flank pain, frequency and urgency.   Musculoskeletal:  Negative for back pain, gait problem, neck pain and neck stiffness.   Skin:  Negative for pallor and rash.   Allergic/Immunologic: Negative for environmental allergies and food allergies.   Neurological:  Negative for dizziness, weakness, numbness and headaches.   Psychiatric/Behavioral:  Negative for confusion.    All other systems reviewed and are negative.          Objective       ED Triage Vitals   Temperature  Pulse Blood Pressure Respirations SpO2 Patient Position - Orthostatic VS   10/11/24 0401 10/11/24 0359 10/11/24 0359 10/11/24 0408 10/11/24 0359 10/11/24 0359   97.5 °F (36.4 °C) 72 147/83 20 100 % Sitting      Temp Source Heart Rate Source BP Location FiO2 (%) Pain Score    10/11/24 0401 10/11/24 0359 10/11/24 0359 -- 10/11/24 0359    Oral Monitor Right arm  10 - Worst Possible Pain      Vitals      Date and Time Temp Pulse SpO2 Resp BP Pain Score FACES Pain Rating User   10/11/24 0645 -- 65 100 % 18 130/76 -- -- JG   10/11/24 0408 -- -- -- 20 -- -- -- RG   10/11/24 0401 97.5 °F (36.4 °C) -- -- -- -- -- -- AH   10/11/24 0359 -- 72 100 % -- 147/83 10 - Worst Possible Pain -- AH            Physical Exam  Vitals and nursing note reviewed.   Constitutional:       General: He is awake.      Appearance: Normal appearance. He is well-developed and normal weight. He is not ill-appearing, toxic-appearing or diaphoretic.      Comments: /83 (BP Location: Right arm)   Pulse 72   Temp 97.5 °F (36.4 °C) (Oral)   Resp 20   SpO2 100%      HENT:      Head: Normocephalic and atraumatic.      Jaw: There is normal jaw occlusion.      Right Ear: Hearing, tympanic membrane and external ear normal. No decreased hearing noted. No drainage, swelling or tenderness. No mastoid tenderness.      Left Ear: Hearing, tympanic membrane and external ear normal. No decreased hearing noted. No drainage, swelling or tenderness. No mastoid tenderness.      Nose: Nose normal.      Mouth/Throat:      Lips: Pink.      Mouth: Mucous membranes are moist.      Pharynx: Oropharynx is clear. Uvula midline.   Eyes:      General: Lids are normal. Vision grossly intact. Gaze aligned appropriately.         Right eye: No discharge.         Left eye: No discharge.      Extraocular Movements: Extraocular movements intact.      Conjunctiva/sclera: Conjunctivae normal.      Pupils: Pupils are equal, round, and reactive to light.   Neck:      Vascular: No JVD.       Trachea: Trachea and phonation normal. No tracheal tenderness or tracheal deviation.   Cardiovascular:      Rate and Rhythm: Normal rate and regular rhythm.      Pulses: Normal pulses.           Radial pulses are 2+ on the right side and 2+ on the left side.        Posterior tibial pulses are 2+ on the right side and 2+ on the left side.      Heart sounds: Normal heart sounds.   Pulmonary:      Effort: Pulmonary effort is normal.      Breath sounds: Normal breath sounds and air entry. No stridor. No decreased breath sounds, wheezing, rhonchi or rales.   Chest:      Chest wall: No tenderness.   Abdominal:      General: Abdomen is flat. Bowel sounds are normal. There is no distension.      Palpations: Abdomen is soft. Abdomen is not rigid.      Tenderness: There is abdominal tenderness in the epigastric area and left lower quadrant. There is no right CVA tenderness, left CVA tenderness, guarding or rebound.   Musculoskeletal:         General: Normal range of motion.      Cervical back: Full passive range of motion without pain, normal range of motion and neck supple. No rigidity. No spinous process tenderness or muscular tenderness. Normal range of motion.   Feet:      Right foot:      Toenail Condition: Right toenails are normal.      Left foot:      Toenail Condition: Left toenails are normal.   Lymphadenopathy:      Head:      Right side of head: No submental, submandibular, tonsillar, preauricular, posterior auricular or occipital adenopathy.      Left side of head: No submental, submandibular, tonsillar, preauricular, posterior auricular or occipital adenopathy.      Cervical: No cervical adenopathy.      Right cervical: No superficial, deep or posterior cervical adenopathy.     Left cervical: No superficial, deep or posterior cervical adenopathy.   Skin:     General: Skin is warm.      Capillary Refill: Capillary refill takes less than 2 seconds.      Findings: No rash.   Neurological:      General: No  focal deficit present.      Mental Status: He is alert and oriented to person, place, and time. Mental status is at baseline.      GCS: GCS eye subscore is 4. GCS verbal subscore is 5. GCS motor subscore is 6.      Sensory: No sensory deficit.      Deep Tendon Reflexes: Reflexes are normal and symmetric.      Reflex Scores:       Patellar reflexes are 2+ on the right side and 2+ on the left side.  Psychiatric:         Attention and Perception: Attention normal.         Mood and Affect: Mood normal.         Speech: Speech normal.         Behavior: Behavior normal. Behavior is cooperative.         Thought Content: Thought content normal.         Judgment: Judgment normal.         Results Reviewed       Procedure Component Value Units Date/Time    UA w Reflex to Microscopic w Reflex to Culture [[id number]]  (Abnormal) Collected: 10/11/24 0644    Lab Status: Final result Specimen: Urine, Clean Catch Updated: 10/11/24 0741     Color, UA Light Yellow     Clarity, UA Clear     Specific Gravity, UA >=1.050     pH, UA 6.5     Leukocytes, UA Negative     Nitrite, UA Negative     Protein, UA Negative mg/dl      Glucose, UA Negative mg/dl      Ketones, UA 10 (1+) mg/dl      Urobilinogen, UA <2.0 mg/dl      Bilirubin, UA Negative     Occult Blood, UA Negative    Comprehensive metabolic panel [[id number]]  (Abnormal) Collected: 10/11/24 0420    Lab Status: Final result Specimen: Blood from Arm, Right Updated: 10/11/24 0524     Sodium 136 mmol/L      Potassium 4.0 mmol/L      Chloride 101 mmol/L      CO2 25 mmol/L      ANION GAP 10 mmol/L      BUN 20 mg/dL      Creatinine 1.17 mg/dL      Glucose 106 mg/dL      Calcium 10.3 mg/dL      AST 14 U/L      ALT 15 U/L      Alkaline Phosphatase 43 U/L      Total Protein 7.9 g/dL      Albumin 5.3 g/dL      Total Bilirubin 0.80 mg/dL      eGFR 79 ml/min/1.73sq m     Narrative:      [employer] Kid[employer] guidelines for Chronic Kidney Disease (CKD):     Stage 1 with normal or high  GFR (GFR > 90 mL/min/1.73 square meters)    Stage 2 Mild CKD (GFR = 60-89 mL/min/1.73 square meters)    Stage 3A Moderate CKD (GFR = 45-59 mL/min/1.73 square meters)    Stage 3B Moderate CKD (GFR = 30-44 mL/min/1.73 square meters)    Stage 4 Severe CKD (GFR = 15-29 mL/min/1.73 square meters)    Stage 5 End Stage CKD (GFR <15 mL/min/1.73 square meters)  Note: GFR calculation is accurate only with a steady state creatinine    Lipase [[id number]]  (Normal) Collected: 10/11/24 0420    Lab Status: Final result Specimen: Blood from Arm, Right Updated: 10/11/24 0524     Lipase 29 u/L     HS Troponin 0hr (reflex protocol) [[id number]]  (Normal) Collected: 10/11/24 0420    Lab Status: Final result Specimen: Blood from Arm, Right Updated: 10/11/24 0508     hs TnI 0hr <2 ng/L     Lactic acid, plasma (w/reflex if result > 2.0) [[id number]]  (Normal) Collected: 10/11/24 0420    Lab Status: Final result Specimen: Blood from Arm, Right Updated: 10/11/24 0504     LACTIC ACID 0.7 mmol/L     Narrative:      Result may be elevated if tourniquet was used during collection.    CBC and differential [[id number]] Collected: 10/11/24 0420    Lab Status: Final result Specimen: Blood from Arm, Right Updated: 10/11/24 0443     WBC 8.52 Thousand/uL      RBC 5.20 Million/uL      Hemoglobin 14.5 g/dL      Hematocrit 42.7 %      MCV 82 fL      MCH 27.9 pg      MCHC 34.0 g/dL      RDW 12.1 %      MPV 9.3 fL      Platelets 287 Thousands/uL      nRBC 0 /100 WBCs      Segmented % 63 %      Immature Grans % 0 %      Lymphocytes % 30 %      Monocytes % 5 %      Eosinophils Relative 2 %      Basophils Relative 0 %      Absolute Neutrophils 5.33 Thousands/µL      Absolute Immature Grans 0.02 Thousand/uL      Absolute Lymphocytes 2.56 Thousands/µL      Absolute Monocytes 0.38 Thousand/µL      Eosinophils Absolute 0.20 Thousand/µL      Basophils Absolute 0.03 Thousands/µL             CT abdomen pelvis with contrast   ED Interpretation by Christ Villareal PA-C  (10/11 0623)   Reading Physician Reading Date Result Priority  Simone Trammell MD  [phone] 10/11/2024     Narrative & Impression  CT ABDOMEN AND PELVIS WITH IV CONTRAST     INDICATION: upper abd left lower quadrant abdominal pain with nausea and vomiting. .     COMPARISON: None.     TECHNIQUE: CT examination of the abdomen and pelvis was performed. Multiplanar 2D reformatted images were created from the source data.     This examination, like all CT scans performed in the [facility], was performed utilizing techniques to minimize radiation dose exposure, including the use of iterative reconstruction and automated exposure control. Radiation dose length   product (DLP) for this visit: 686 mGy-cm     IV Contrast: 85 mL of iohexol (OMNIPAQUE)  Enteric Contrast: Not administered.     FINDINGS:     ABDOMEN     LOWER CHEST: No clinically significant abnormality in the visualized lower chest.     LIVER/BILIARY TREE: Unremarkable.     GALLBLADDER: No calcified gallstones. No perich   olecystic inflammatory change.     SPLEEN: Unremarkable.     PANCREAS: Unremarkable.     ADRENAL GLANDS: Unremarkable.     KIDNEYS/URETERS: Unremarkable. No hydronephrosis.     STOMACH AND BOWEL: Unremarkable.     APPENDIX: No findings to suggest appendicitis.     ABDOMINOPELVIC CAVITY: No ascites. No pneumoperitoneum. No lymphadenopathy.     VESSELS: Unremarkable for patient's age.     PELVIS     REPRODUCTIVE ORGANS: Unremarkable for patient's age.     URINARY BLADDER: Unremarkable.     ABDOMINAL WALL/INGUINAL REGIONS: Unremarkable.     BONES: No acute fracture or suspicious osseous lesion.     IMPRESSION:     No acute findings in the abdomen or pelvis.        Workstation performed: [id number]        Final Interpretation by Simone Trammell MD (10/11 0620)      No acute findings in the abdomen or pelvis.         Workstation performed: [id number]             ECG 12 Lead Documentation Only    Date/Time: 10/11/2024  4:59 AM    Performed by: Christ Villareal PA-C  Authorized by: Christ Villareal PA-C    Indications / Diagnosis:  Generalized abdominal pain; nausea  ECG reviewed by me, the ED Provider: yes    Patient location:  ED  Previous ECG:     Previous ECG:  Unavailable    Comparison to cardiac monitor: Yes    Interpretation:     Interpretation: normal    Rate:     ECG rate:  57    ECG rate assessment: bradycardic    Rhythm:     Rhythm: sinus bradycardia    Ectopy:     Ectopy: none    QRS:     QRS axis:  Normal    QRS intervals:  Normal  Conduction:     Conduction: normal    ST segments:     ST segments:  Normal  T waves:     T waves: normal        ED Medication and Procedure Management   Prior to Admission Medications   Prescriptions Last Dose Informant Patient Reported? Taking?   albuterol (PROVENTIL HFA,VENTOLIN HFA) 90 mcg/act inhaler   No No   Sig: Inhale 2 puffs 4 (four) times a day      Facility-Administered Medications: None     Patient's Medications   Discharge Prescriptions    ONDANSETRON (ZOFRAN) 4 MG TABLET    Take 1 tablet (4 mg total) by mouth every 8 (eight) hours as needed for nausea or vomiting for up to 3 days       Start Date: 10/11/2024End Date: 10/14/2024       Order Dose: 4 mg       Quantity: 9 tablet    Refills: 0     No discharge procedures on file.  ED SEPSIS DOCUMENTATION   Time reflects when diagnosis was documented in both MDM as applicable and the Disposition within this note       Time User Action Codes Description Comment    10/11/2024  6:24 AM Christ Villareal [K59.00] Constipation     10/11/2024  6:24 AM Christ Villareal [R11.2] Nausea and vomiting     10/11/2024  6:54 AM Christ Villareal [R10.84] Generalized abdominal pain                  Christ Villareal PA-C  10/11/24 0800

## 2024-10-12 ENCOUNTER — APPOINTMENT (EMERGENCY)
Dept: CT IMAGING | Facility: HOSPITAL | Age: 39
End: 2024-10-12
Payer: COMMERCIAL

## 2024-10-12 ENCOUNTER — HOSPITAL ENCOUNTER (OUTPATIENT)
Facility: HOSPITAL | Age: 39
Setting detail: OBSERVATION
Discharge: HOME | End: 2024-10-14
Attending: EMERGENCY MEDICINE | Admitting: INTERNAL MEDICINE
Payer: COMMERCIAL

## 2024-10-12 DIAGNOSIS — R10.9 INTRACTABLE ABDOMINAL PAIN: Primary | ICD-10-CM

## 2024-10-12 DIAGNOSIS — R82.4 URINE KETONES: ICD-10-CM

## 2024-10-12 LAB
ALBUMIN SERPL BCG-MCNC: 4.3 G/DL (ref 3.5–5)
ALBUMIN SERPL BCG-MCNC: 4.8 G/DL (ref 3.5–5)
ALP SERPL-CCNC: 38 U/L (ref 34–104)
ALP SERPL-CCNC: 42 U/L (ref 34–104)
ALT SERPL W P-5'-P-CCNC: 11 U/L (ref 7–52)
ALT SERPL W P-5'-P-CCNC: 13 U/L (ref 7–52)
ANION GAP SERPL CALCULATED.3IONS-SCNC: 12 MMOL/L (ref 4–13)
ANION GAP SERPL CALCULATED.3IONS-SCNC: 5 MMOL/L (ref 4–13)
AST SERPL W P-5'-P-CCNC: 11 U/L (ref 13–39)
AST SERPL W P-5'-P-CCNC: 12 U/L (ref 13–39)
BASOPHILS # BLD AUTO: 0.03 THOUSANDS/ΜL (ref 0–0.1)
BASOPHILS # BLD AUTO: 0.04 THOUSANDS/ΜL (ref 0–0.1)
BASOPHILS NFR BLD AUTO: 1 % (ref 0–1)
BASOPHILS NFR BLD AUTO: 1 % (ref 0–1)
BILIRUB SERPL-MCNC: 0.63 MG/DL (ref 0.2–1)
BILIRUB SERPL-MCNC: 0.65 MG/DL (ref 0.2–1)
BUN SERPL-MCNC: 12 MG/DL (ref 5–25)
BUN SERPL-MCNC: 15 MG/DL (ref 5–25)
CALCIUM SERPL-MCNC: 8.9 MG/DL (ref 8.4–10.2)
CALCIUM SERPL-MCNC: 9.4 MG/DL (ref 8.4–10.2)
CHLORIDE SERPL-SCNC: 104 MMOL/L (ref 96–108)
CHLORIDE SERPL-SCNC: 107 MMOL/L (ref 96–108)
CO2 SERPL-SCNC: 20 MMOL/L (ref 21–32)
CO2 SERPL-SCNC: 24 MMOL/L (ref 21–32)
CREAT SERPL-MCNC: 1.01 MG/DL (ref 0.6–1.3)
CREAT SERPL-MCNC: 1.14 MG/DL (ref 0.6–1.3)
CRP SERPL QL: <1 MG/L
EOSINOPHIL # BLD AUTO: 0.17 THOUSAND/ΜL (ref 0–0.61)
EOSINOPHIL # BLD AUTO: 0.18 THOUSAND/ΜL (ref 0–0.61)
EOSINOPHIL NFR BLD AUTO: 2 % (ref 0–6)
EOSINOPHIL NFR BLD AUTO: 3 % (ref 0–6)
ERYTHROCYTE [DISTWIDTH] IN BLOOD BY AUTOMATED COUNT: 12.2 % (ref 11.6–15.1)
ERYTHROCYTE [DISTWIDTH] IN BLOOD BY AUTOMATED COUNT: 12.3 % (ref 11.6–15.1)
ERYTHROCYTE [SEDIMENTATION RATE] IN BLOOD: 2 MM/HOUR (ref 0–14)
GFR SERPL CREATININE-BSD FRML MDRD: 82 ML/MIN/1.73SQ M
GFR SERPL CREATININE-BSD FRML MDRD: 95 ML/MIN/1.73SQ M
GLUCOSE SERPL-MCNC: 102 MG/DL (ref 65–140)
GLUCOSE SERPL-MCNC: 114 MG/DL (ref 65–140)
HCT VFR BLD AUTO: 37.4 % (ref 36.5–49.3)
HCT VFR BLD AUTO: 39 % (ref 36.5–49.3)
HGB BLD-MCNC: 12.9 G/DL (ref 12–17)
HGB BLD-MCNC: 13.5 G/DL (ref 12–17)
IMM GRANULOCYTES # BLD AUTO: 0.01 THOUSAND/UL (ref 0–0.2)
IMM GRANULOCYTES # BLD AUTO: 0.03 THOUSAND/UL (ref 0–0.2)
IMM GRANULOCYTES NFR BLD AUTO: 0 % (ref 0–2)
IMM GRANULOCYTES NFR BLD AUTO: 0 % (ref 0–2)
LACTATE SERPL-SCNC: 0.5 MMOL/L (ref 0.5–2)
LIPASE SERPL-CCNC: 38 U/L (ref 11–82)
LYMPHOCYTES # BLD AUTO: 2.02 THOUSANDS/ΜL (ref 0.6–4.47)
LYMPHOCYTES # BLD AUTO: 2.89 THOUSANDS/ΜL (ref 0.6–4.47)
LYMPHOCYTES NFR BLD AUTO: 36 % (ref 14–44)
LYMPHOCYTES NFR BLD AUTO: 37 % (ref 14–44)
MCH RBC QN AUTO: 28.4 PG (ref 26.8–34.3)
MCH RBC QN AUTO: 28.9 PG (ref 26.8–34.3)
MCHC RBC AUTO-ENTMCNC: 34.5 G/DL (ref 31.4–37.4)
MCHC RBC AUTO-ENTMCNC: 34.6 G/DL (ref 31.4–37.4)
MCV RBC AUTO: 82 FL (ref 82–98)
MCV RBC AUTO: 84 FL (ref 82–98)
MONOCYTES # BLD AUTO: 0.36 THOUSAND/ΜL (ref 0.17–1.22)
MONOCYTES # BLD AUTO: 0.46 THOUSAND/ΜL (ref 0.17–1.22)
MONOCYTES NFR BLD AUTO: 6 % (ref 4–12)
MONOCYTES NFR BLD AUTO: 6 % (ref 4–12)
NEUTROPHILS # BLD AUTO: 3.05 THOUSANDS/ΜL (ref 1.85–7.62)
NEUTROPHILS # BLD AUTO: 4.24 THOUSANDS/ΜL (ref 1.85–7.62)
NEUTS SEG NFR BLD AUTO: 54 % (ref 43–75)
NEUTS SEG NFR BLD AUTO: 54 % (ref 43–75)
NRBC BLD AUTO-RTO: 0 /100 WBCS
NRBC BLD AUTO-RTO: 0 /100 WBCS
PLATELET # BLD AUTO: 200 THOUSANDS/UL (ref 149–390)
PLATELET # BLD AUTO: 265 THOUSANDS/UL (ref 149–390)
PMV BLD AUTO: 8.9 FL (ref 8.9–12.7)
PMV BLD AUTO: 9.3 FL (ref 8.9–12.7)
POTASSIUM SERPL-SCNC: 3.9 MMOL/L (ref 3.5–5.3)
POTASSIUM SERPL-SCNC: 4.2 MMOL/L (ref 3.5–5.3)
PROT SERPL-MCNC: 6.2 G/DL (ref 6.4–8.4)
PROT SERPL-MCNC: 6.9 G/DL (ref 6.4–8.4)
RBC # BLD AUTO: 4.47 MILLION/UL (ref 3.88–5.62)
RBC # BLD AUTO: 4.75 MILLION/UL (ref 3.88–5.62)
SODIUM SERPL-SCNC: 136 MMOL/L (ref 135–147)
SODIUM SERPL-SCNC: 136 MMOL/L (ref 135–147)
TSH SERPL DL<=0.05 MIU/L-ACNC: 1.43 UIU/ML (ref 0.45–4.5)
WBC # BLD AUTO: 5.64 THOUSAND/UL (ref 4.31–10.16)
WBC # BLD AUTO: 7.84 THOUSAND/UL (ref 4.31–10.16)

## 2024-10-12 PROCEDURE — 84443 ASSAY THYROID STIM HORMONE: CPT

## 2024-10-12 PROCEDURE — 83605 ASSAY OF LACTIC ACID: CPT

## 2024-10-12 PROCEDURE — 85652 RBC SED RATE AUTOMATED: CPT

## 2024-10-12 PROCEDURE — 99222 1ST HOSP IP/OBS MODERATE 55: CPT

## 2024-10-12 PROCEDURE — 96375 TX/PRO/DX INJ NEW DRUG ADDON: CPT

## 2024-10-12 PROCEDURE — 96374 THER/PROPH/DIAG INJ IV PUSH: CPT

## 2024-10-12 PROCEDURE — 96376 TX/PRO/DX INJ SAME DRUG ADON: CPT

## 2024-10-12 PROCEDURE — 96361 HYDRATE IV INFUSION ADD-ON: CPT

## 2024-10-12 PROCEDURE — 86140 C-REACTIVE PROTEIN: CPT

## 2024-10-12 PROCEDURE — 85025 COMPLETE CBC W/AUTO DIFF WBC: CPT

## 2024-10-12 PROCEDURE — 36415 COLL VENOUS BLD VENIPUNCTURE: CPT

## 2024-10-12 PROCEDURE — 74177 CT ABD & PELVIS W/CONTRAST: CPT

## 2024-10-12 PROCEDURE — 80053 COMPREHEN METABOLIC PANEL: CPT

## 2024-10-12 PROCEDURE — 83690 ASSAY OF LIPASE: CPT

## 2024-10-12 PROCEDURE — 99284 EMERGENCY DEPT VISIT MOD MDM: CPT

## 2024-10-12 RX ORDER — MAGNESIUM HYDROXIDE/ALUMINUM HYDROXICE/SIMETHICONE 120; 1200; 1200 MG/30ML; MG/30ML; MG/30ML
30 SUSPENSION ORAL EVERY 4 HOURS PRN
Status: DISCONTINUED | OUTPATIENT
Start: 2024-10-12 | End: 2024-10-14 | Stop reason: HOSPADM

## 2024-10-12 RX ORDER — LACTULOSE 10 G/15ML
20 SOLUTION ORAL ONCE
Status: COMPLETED | OUTPATIENT
Start: 2024-10-12 | End: 2024-10-12

## 2024-10-12 RX ORDER — KETOROLAC TROMETHAMINE 30 MG/ML
15 INJECTION, SOLUTION INTRAMUSCULAR; INTRAVENOUS ONCE
Status: COMPLETED | OUTPATIENT
Start: 2024-10-12 | End: 2024-10-12

## 2024-10-12 RX ORDER — OXYCODONE HYDROCHLORIDE 5 MG/1
5 TABLET ORAL EVERY 6 HOURS PRN
Status: DISCONTINUED | OUTPATIENT
Start: 2024-10-12 | End: 2024-10-14 | Stop reason: HOSPADM

## 2024-10-12 RX ORDER — HYDROMORPHONE HCL IN WATER/PF 6 MG/30 ML
0.2 PATIENT CONTROLLED ANALGESIA SYRINGE INTRAVENOUS ONCE
Status: COMPLETED | OUTPATIENT
Start: 2024-10-12 | End: 2024-10-12

## 2024-10-12 RX ORDER — DICYCLOMINE HYDROCHLORIDE 10 MG/1
10 CAPSULE ORAL
Status: DISCONTINUED | OUTPATIENT
Start: 2024-10-12 | End: 2024-10-14 | Stop reason: HOSPADM

## 2024-10-12 RX ORDER — SUCRALFATE 1 G/1
1 TABLET ORAL
Status: DISCONTINUED | OUTPATIENT
Start: 2024-10-12 | End: 2024-10-14 | Stop reason: HOSPADM

## 2024-10-12 RX ORDER — ALBUTEROL SULFATE 90 UG/1
2 INHALANT RESPIRATORY (INHALATION) 4 TIMES DAILY
Status: DISCONTINUED | OUTPATIENT
Start: 2024-10-12 | End: 2024-10-14 | Stop reason: HOSPADM

## 2024-10-12 RX ORDER — HYDROMORPHONE HCL/PF 1 MG/ML
0.5 SYRINGE (ML) INJECTION EVERY 4 HOURS PRN
Status: DISCONTINUED | OUTPATIENT
Start: 2024-10-12 | End: 2024-10-14 | Stop reason: HOSPADM

## 2024-10-12 RX ORDER — ACETAMINOPHEN 325 MG/1
975 TABLET ORAL EVERY 8 HOURS
Status: DISCONTINUED | OUTPATIENT
Start: 2024-10-12 | End: 2024-10-14 | Stop reason: HOSPADM

## 2024-10-12 RX ORDER — PANTOPRAZOLE SODIUM 40 MG/1
40 TABLET, DELAYED RELEASE ORAL
Status: DISCONTINUED | OUTPATIENT
Start: 2024-10-12 | End: 2024-10-14 | Stop reason: HOSPADM

## 2024-10-12 RX ORDER — SODIUM CHLORIDE, SODIUM GLUCONATE, SODIUM ACETATE, POTASSIUM CHLORIDE, MAGNESIUM CHLORIDE, SODIUM PHOSPHATE, DIBASIC, AND POTASSIUM PHOSPHATE .53; .5; .37; .037; .03; .012; .00082 G/100ML; G/100ML; G/100ML; G/100ML; G/100ML; G/100ML; G/100ML
100 INJECTION, SOLUTION INTRAVENOUS CONTINUOUS
Status: DISCONTINUED | OUTPATIENT
Start: 2024-10-12 | End: 2024-10-13

## 2024-10-12 RX ADMIN — DICYCLOMINE HYDROCHLORIDE 10 MG: 10 CAPSULE ORAL at 21:03

## 2024-10-12 RX ADMIN — IOHEXOL 85 ML: 350 INJECTION, SOLUTION INTRAVENOUS at 03:55

## 2024-10-12 RX ADMIN — HYDROMORPHONE HYDROCHLORIDE 0.2 MG: 0.2 INJECTION, SOLUTION INTRAMUSCULAR; INTRAVENOUS; SUBCUTANEOUS at 02:15

## 2024-10-12 RX ADMIN — SUCRALFATE 1 G: 1 TABLET ORAL at 17:10

## 2024-10-12 RX ADMIN — SODIUM CHLORIDE, SODIUM GLUCONATE, SODIUM ACETATE, POTASSIUM CHLORIDE, MAGNESIUM CHLORIDE, SODIUM PHOSPHATE, DIBASIC, AND POTASSIUM PHOSPHATE 100 ML/HR: .53; .5; .37; .037; .03; .012; .00082 INJECTION, SOLUTION INTRAVENOUS at 17:55

## 2024-10-12 RX ADMIN — DICYCLOMINE HYDROCHLORIDE 10 MG: 10 CAPSULE ORAL at 06:46

## 2024-10-12 RX ADMIN — KETOROLAC TROMETHAMINE 15 MG: 30 INJECTION, SOLUTION INTRAMUSCULAR; INTRAVENOUS at 02:00

## 2024-10-12 RX ADMIN — LACTULOSE 20 G: 20 SOLUTION ORAL at 02:00

## 2024-10-12 RX ADMIN — DICYCLOMINE HYDROCHLORIDE 10 MG: 10 CAPSULE ORAL at 17:10

## 2024-10-12 RX ADMIN — PANTOPRAZOLE SODIUM 40 MG: 40 TABLET, DELAYED RELEASE ORAL at 06:46

## 2024-10-12 RX ADMIN — DICYCLOMINE HYDROCHLORIDE 10 MG: 10 CAPSULE ORAL at 11:50

## 2024-10-12 RX ADMIN — OXYCODONE HYDROCHLORIDE 5 MG: 5 TABLET ORAL at 08:07

## 2024-10-12 RX ADMIN — ACETAMINOPHEN 975 MG: 325 TABLET ORAL at 14:54

## 2024-10-12 RX ADMIN — SUCRALFATE 1 G: 1 TABLET ORAL at 11:50

## 2024-10-12 RX ADMIN — ACETAMINOPHEN 975 MG: 325 TABLET ORAL at 21:16

## 2024-10-12 RX ADMIN — ALBUTEROL SULFATE 2 PUFF: 90 AEROSOL, METERED RESPIRATORY (INHALATION) at 11:51

## 2024-10-12 RX ADMIN — ACETAMINOPHEN 975 MG: 325 TABLET ORAL at 06:45

## 2024-10-12 RX ADMIN — SODIUM CHLORIDE, SODIUM GLUCONATE, SODIUM ACETATE, POTASSIUM CHLORIDE, MAGNESIUM CHLORIDE, SODIUM PHOSPHATE, DIBASIC, AND POTASSIUM PHOSPHATE 100 ML/HR: .53; .5; .37; .037; .03; .012; .00082 INJECTION, SOLUTION INTRAVENOUS at 06:51

## 2024-10-12 RX ADMIN — HYDROMORPHONE HYDROCHLORIDE 0.2 MG: 0.2 INJECTION, SOLUTION INTRAMUSCULAR; INTRAVENOUS; SUBCUTANEOUS at 03:45

## 2024-10-12 RX ADMIN — SUCRALFATE 1 G: 1 TABLET ORAL at 06:46

## 2024-10-12 RX ADMIN — SUCRALFATE 1 G: 1 TABLET ORAL at 21:03

## 2024-10-12 RX ADMIN — GLYCERIN 1 SUPPOSITORY: 2 SUPPOSITORY RECTAL at 02:28

## 2024-10-12 RX ADMIN — ALBUTEROL SULFATE 2 PUFF: 90 AEROSOL, METERED RESPIRATORY (INHALATION) at 17:10

## 2024-10-12 RX ADMIN — SODIUM CHLORIDE 1000 ML: 0.9 INJECTION, SOLUTION INTRAVENOUS at 01:59

## 2024-10-12 NOTE — ED ATTENDING ATTESTATION
I was the supervising/collaborating physician in the Emergency Department.  I acknowledge the AP's documentation and services provided. I was available by phone and in person, if needed, for consultation.    Genevieve Fuentes MD

## 2024-10-12 NOTE — ASSESSMENT & PLAN NOTE
Patient presents with intractable abdominal pain was seen yesterday in the ED for similar.  Reports abdominal pain has been going on for the past several weeks worsening over the past couple of days with associated nausea, vomiting, chills.  Patient denies any diarrhea, sick contacts, or recent travel.  Originally was felt to be secondary to constipation he received multiple laxatives in the ED without relief of symptoms.  Patient still having intractable abdominal pain and abdominal cramping. Last normal BM was on 10/10  10/11 CT abdomen without acute intra-abdominal pathology  10/12 repeat CT without any acute changes.  CMP, lipase WNL.  No signs of acute infectious process seen not meeting SIRS criteria.  Suspect abdominal pain is multifactorial possible abdominal cramping from recent laxative intake vs gastritis vs GERD vs gastroenteritis vs constipation     Plan  Will initiate patient on Protonix, Bentyl, Carafate  As needed antinausea medication ordered  Limit amount of narcotics as this may contribute to worsening constipation  Clear liquid diet advance as tolerated  Multimodal pain regimen  If patient has no improvement in symptoms will consider GI consultation

## 2024-10-12 NOTE — H&P
H&P - Hospitalist   Name: Neo Vides 38 y.o. male I MRN: [id number]  Unit/Bed#: ED-01 I Date of Admission: 10/12/2024   Date of Service: 10/12/2024 I Hospital Day: 0     Assessment & Plan  Intractable abdominal pain  Patient presents with intractable abdominal pain was seen yesterday in the ED for similar.  Reports abdominal pain has been going on for the past several weeks worsening over the past couple of days with associated nausea, vomiting, chills.  Patient denies any diarrhea, sick contacts, or recent travel.  Originally was felt to be secondary to constipation he received multiple laxatives in the ED without relief of symptoms.  Patient still having intractable abdominal pain and abdominal cramping. Last normal BM was on 10/10  10/11 CT abdomen without acute intra-abdominal pathology  10/12 repeat CT without any acute changes.  CMP, lipase WNL.  No signs of acute infectious process seen not meeting SIRS criteria.  Suspect abdominal pain is multifactorial possible abdominal cramping from recent laxative intake vs gastritis vs GERD vs gastroenteritis vs constipation     Plan  Will initiate patient on Protonix, Bentyl, Carafate  As needed antinausea medication ordered  Limit amount of narcotics as this may contribute to worsening constipation  Clear liquid diet advance as tolerated  Multimodal pain regimen  If patient has no improvement in symptoms will consider GI consultation  Asthma  Does not appear in acute exacerbation at this time  Continue as needed albuterol      VTE Pharmacologic Prophylaxis:   Low Risk (Score 0-2) - Encourage Ambulation.  Code Status: Level 1 - Full Code   Discussion with family: Patient declined call to .     Anticipated Length of Stay: Patient will be admitted on an observation basis with an anticipated length of stay of less than 2 midnights secondary to intractable abdominal pain.    History of Present Illness   Chief Complaint: Abdominal pain    Neo  Mahogany is a 38 y.o. male with a PMH of asthma who presents with abdominal pain.    Patient recently seen on 10/11 for similar..  Patient reports ongoing abdominal pain over the past couple of days with change of bowel habits, nausea, vomiting, and chills.  He reports recent GI bug.  During initial evaluation on 10/11 and was treated for constipation.  CT imaging did not show significant stool burden and was otherwise unremarkable.  He was given magnesium citrate, antiemetic, and pain medication with improvement in symptoms.  He returns today with worsening abdominal cramping, feeling of tenesmus, nausea, and worsening abdominal pain.  Workup thus far has been unrevealing.  On exam patient is visibly uncomfortable reporting generalized abdominal pain/tenderness.  He denies any palliating or provoking factors.  He states that he has been having feelings of tenesmus and abdominal cramping.  Since taking laxatives he has not had formed bowel movement only watery stool.  He reports last formed bowel movement was on 10/10. He reports over the last week or so change in bowel habits.  He reports eating less.  He denies any relief of symptoms with bowel movement or passing gas.  He denies any prior abdominal surgeries or GI issues in the past.  He does have a brother with Crohn's disease.  Patient denies any tobacco use reports social use of alcohol and has been chronic marijuana smoker x 20 years.    Review of Systems   Gastrointestinal:  Positive for abdominal pain, constipation, diarrhea, nausea and vomiting.   Genitourinary:  Negative for difficulty urinating.       Historical Information   Past Medical History:   Diagnosis Date    Asthma     Eczema     History of chickenpox      Past Surgical History:   Procedure Laterality Date    CIRCUMCISION      WISDOM TOOTH EXTRACTION       Social History     Tobacco Use    Smoking status: Former     Current packs/day: 0.00     Types: Cigarettes     Quit date: 2005     Years  since quittin.7    Smokeless tobacco: Never    Tobacco comments:     social smoking-couple cigarettes a week    Vaping Use    Vaping status: Never Used   Substance and Sexual Activity    Alcohol use: Yes     Comment: socially     Drug use: Yes     Types: Marijuana    Sexual activity: Not on file     E-Cigarette/Vaping    E-Cigarette Use Never User      E-Cigarette/Vaping Substances    Nicotine No     THC No     CBD No     Flavoring No     Other No     Unknown No        Social History:  Marital Status: /Civil Union   Occupation:   Patient Pre-hospital Living Situation: Home  Patient Pre-hospital Level of Mobility: walks  Patient Pre-hospital Diet Restrictions: None    Meds/Allergies   (Not in a hospital admission)    Allergies   Allergen Reactions    Pollen Extract Allergic Rhinitis       Objective :  Temp:  [97.9 °F (36.6 °C)] 97.9 °F (36.6 °C)  HR:  [56-77] 56  BP: (112-126)/(68-77) 112/68  Resp:  [18-22] 18  SpO2:  [100 %] 100 %  O2 Device: None (Room air)    Physical Exam  Constitutional:       General: He is in acute distress.      Appearance: He is not ill-appearing or toxic-appearing.   HENT:      Mouth/Throat:      Mouth: Mucous membranes are moist.   Cardiovascular:      Rate and Rhythm: Normal rate and regular rhythm.      Pulses: Normal pulses.      Heart sounds: No murmur heard.     No gallop.   Pulmonary:      Effort: Pulmonary effort is normal. No respiratory distress.      Breath sounds: Normal breath sounds. No wheezing or rales.   Abdominal:      General: There is no distension.      Palpations: Abdomen is soft. There is no mass.      Tenderness: There is generalized abdominal tenderness. There is guarding. There is no rebound.      Hernia: No hernia is present.   Musculoskeletal:      Right lower leg: No edema.      Left lower leg: No edema.   Neurological:      Mental Status: He is alert.         Lines/Drains:            Lab Results: I have reviewed the following results:  Results from  last 7 days   Lab Units 10/12/24  0200   WBC Thousand/uL 7.84   HEMOGLOBIN g/dL 13.5   HEMATOCRIT % 39.0   PLATELETS Thousands/uL 265   SEGS PCT % 54   LYMPHO PCT % 37   MONO PCT % 6   EOS PCT % 2     Results from last 7 days   Lab Units 10/12/24  0200   SODIUM mmol/L 136   POTASSIUM mmol/L 3.9   CHLORIDE mmol/L 104   CO2 mmol/L 20*   BUN mg/dL 15   CREATININE mg/dL 1.14   ANION GAP mmol/L 12   CALCIUM mg/dL 9.4   ALBUMIN g/dL 4.8   TOTAL BILIRUBIN mg/dL 0.65   ALK PHOS U/L 42   ALT U/L 13   AST U/L 12*   GLUCOSE RANDOM mg/dL 102             Lab Results   Component Value Date    HGBA1C 4.9 09/20/2021     Results from last 7 days   Lab Units 10/12/24  0200 10/11/24  0420   LACTIC ACID mmol/L 0.5 0.7       Imaging Results Review: I reviewed radiology reports from this admission including: CT abdomen/pelvis.  Other Study Results Review: No additional pertinent studies reviewed.    Administrative Statements   I have spent a total time of 45 minutes in caring for this patient on the day of the visit/encounter including Diagnostic results, Risks and benefits of tx options, Instructions for management, Importance of tx compliance, Impressions, Counseling / Coordination of care, Documenting in the medical record, Reviewing / ordering tests, medicine, procedures  , Obtaining or reviewing history  , and Communicating with other healthcare professionals .    ** Please Note: This note has been constructed using a voice recognition system. **

## 2024-10-12 NOTE — ED PROVIDER NOTES
Time reflects when diagnosis was documented in both MDM as applicable and the Disposition within this note       Time User Action Codes Description Comment    10/12/2024  5:37 AM Meka De Anda Add [R10.9] Intractable abdominal pain     10/12/2024  5:38 AM Meka De Anda Add [R82.4] Urine ketones           ED Disposition       ED Disposition   Admit    Condition   Stable    Date/Time   Sat Oct 12, 2024  5:37 AM    Comment   Case was discussed with Shaw Shaffer and the patient's admission status was agreed to be Admission Status: observation status to the service of Dr. Monique.               Assessment & Plan       Medical Decision Making  Patient seen and examined noted to have intractable abdominal pain and urine ketones.  Patient coming in for second time in 2 days for abdominal pain and cramping.  Urine yesterday revealed ketones and CT showed no acute intra-abdominal pathologies.  Labs were repeated and were unremarkable again today.  Patient's pain continued to return after Toradol and 2 doses of Dilaudid. In addition to this pain patient continued to guard on his abdominal exam.  Because of this patient was admitted for observation as his pain continued to return after 3 doses of IV medication without a distinct cause.    Differential diagnosis includes but is not limited to appendicitis, gastroenteritis, gastritis, PUD, GERD, gastroparesis, hepatitis, pancreatitis, colitis, enteritis, food poisoning, epiploic appendagitis, mesenteric adenitis, mesenteric panniculitis, IBD, IBS, ileus, bowel obstruction, volvulus, cholecystitis, biliary colic, choledocholithiasis, perforated viscus, tumor, splenic etiology, constipation, diverticulitis, internal hernia, testicular torsion, renal colic, pyelonephritis, UTI.     Patient's labs notable for: Mentioned above    Imaging revealed:   Mentioned above    Discussed patient's case with Shaw Shaffer ([facility]) regarding admission who accepted the  "patient for further evaluation and management.    All labs reviewed and utilized in the medical decision making process (if labs were ordered). Portions of the record may have been created with voice recognition software.  Occasional wrong word or \"sound a like\" substitutions may have occurred due to the inherent limitations of voice recognition software.  Read the chart carefully and recognize, using context, where substitutions have occurred.      Amount and/or Complexity of Data Reviewed  Labs: ordered.  Radiology: ordered.    Risk  OTC drugs.  Prescription drug management.  Decision regarding hospitalization.        ED Course as of 10/12/24 0559   Sat Oct 12, 2024   0237 No impaction on LATOSHA. Was not able to obtain sufficient stool sample on exam.    0239 Suppository inserted   0304 Patient in the bathroom now   0428 Patient's pain improved with dilaudid. Declining anything further for pain at this time. Testicle exam unremarkable   0500 CT unremarkable - gave patient crackers and water which he was able to eat       Medications   sodium chloride 0.9 % bolus 1,000 mL (0 mL Intravenous Stopped 10/12/24 0345)   ketorolac (TORADOL) injection 15 mg (15 mg Intravenous Given 10/12/24 0200)   glycerin (adult) rectal suppository 1 suppository (1 suppository Rectal Given 10/12/24 0228)   lactulose (CHRONULAC) oral solution 20 g (20 g Oral Given 10/12/24 0200)   HYDROmorphone HCl (DILAUDID) injection 0.2 mg (0.2 mg Intravenous Given 10/12/24 0215)   HYDROmorphone HCl (DILAUDID) injection 0.2 mg (0.2 mg Intravenous Given 10/12/24 0345)   iohexol (OMNIPAQUE) 350 MG/ML injection (MULTI-DOSE) 85 mL (85 mL Intravenous Given 10/12/24 0355)       ED Risk Strat Scores                                               History of Present Illness       Chief Complaint   Patient presents with    Abdominal Pain     Patient presents with severe abdominal cramping. States he was here last night, given mag citrate. States he had liquid BM but " now back to having the cramping. Feeling lightheaded now.        Past Medical History:   Diagnosis Date    Asthma     Eczema     History of chickenpox       Past Surgical History:   Procedure Laterality Date    CIRCUMCISION      WISDOM TOOTH EXTRACTION        Family History   Problem Relation Age of Onset    No Known Problems Mother     Hypertension Father     Crohn's disease Brother     No Known Problems Daughter       Social History     Tobacco Use    Smoking status: Former     Current packs/day: 0.00     Types: Cigarettes     Quit date:      Years since quittin.7    Smokeless tobacco: Never    Tobacco comments:     social smoking-couple cigarettes a week    Vaping Use    Vaping status: Never Used   Substance Use Topics    Alcohol use: Yes     Comment: socially     Drug use: Yes     Types: Marijuana      E-Cigarette/Vaping    E-Cigarette Use Never User       E-Cigarette/Vaping Substances    Nicotine No     THC No     CBD No     Flavoring No     Other No     Unknown No       I have reviewed and agree with the history as documented.     Neo Vides is a 38 y.o. male with no significant PMH presenting to the ED on 2024 with abdominal pain. Patient endorses that he is having severe abdominal cramping. He states that he was here earlier last night/in the morning and diagnosed with constipation and given mag citrate. Patient states that he had a liquidy BM however was not a normal bowel movement for him. He states that he has had some GI issues secondary to what he thought was a viral GI bug over the last few weeks that is a acutely worsening in the last 2 to 3 days however cites that besides that he typically has a bowel movement on a daily basis. He has had associated nausea, vomiting and chills with this since the onset of his pain. Patient denies fevers, urinary symptoms, chest pain, shortness of breath, testicular pain, penile pain or any other complaints at this time.            Review  of Systems   Constitutional:  Positive for chills. Negative for appetite change and fever.   Respiratory:  Negative for cough and shortness of breath.    Cardiovascular:  Negative for chest pain and palpitations.   Gastrointestinal:  Positive for abdominal pain, constipation, diarrhea, nausea and vomiting.        Small amount of liquidy stool after mag citrate however prior to that no BMs   Genitourinary:  Negative for difficulty urinating, dysuria, flank pain, frequency, hematuria, penile pain, scrotal swelling, testicular pain and urgency.   Musculoskeletal:  Negative for back pain.   Skin:  Negative for pallor and wound.           Objective       ED Triage Vitals   Temperature Pulse Blood Pressure Respirations SpO2 Patient Position - Orthostatic VS   10/12/24 0415 10/12/24 0127 10/12/24 0129 10/12/24 0127 10/12/24 0127 10/12/24 0127   97.9 °F (36.6 °C) 77 126/77 22 100 % Sitting      Temp Source Heart Rate Source BP Location FiO2 (%) Pain Score    10/12/24 0415 10/12/24 0127 10/12/24 0127 -- 10/12/24 0200    Oral Monitor Right arm  8      Vitals      Date and Time Temp Pulse SpO2 Resp BP Pain Score FACES Pain Rating User   10/12/24 0415 97.9 °F (36.6 °C) -- -- -- -- -- -- MD   10/12/24 0350 -- 56 100 % 18 112/68 -- -- MD   10/12/24 0345 -- -- -- -- -- 9 -- MD   10/12/24 0215 -- -- -- -- -- 8 --    10/12/24 0200 -- -- -- -- -- 8 -- MD   10/12/24 0129 -- -- -- -- 126/77 -- --    10/12/24 0127 -- 77 100 % 22 -- -- --             Physical Exam  Vitals and nursing note reviewed. Exam conducted with a chaperone present.   Constitutional:       General: He is not in acute distress.     Appearance: Normal appearance. He is well-developed and normal weight. He is not ill-appearing, toxic-appearing or diaphoretic.   HENT:      Head: Normocephalic and atraumatic.      Right Ear: External ear normal.      Left Ear: External ear normal.      Nose: Nose normal. No congestion or rhinorrhea.      Mouth/Throat:      Mouth:  Mucous membranes are moist.   Eyes:      General: No scleral icterus.        Right eye: No discharge.         Left eye: No discharge.      Extraocular Movements: Extraocular movements intact.      Conjunctiva/sclera: Conjunctivae normal.   Cardiovascular:      Rate and Rhythm: Normal rate and regular rhythm.      Heart sounds: Normal heart sounds. No murmur heard.     No friction rub. No gallop.   Pulmonary:      Effort: Pulmonary effort is normal. No respiratory distress.      Breath sounds: Normal breath sounds. No wheezing, rhonchi or rales.   Abdominal:      General: Abdomen is flat. Bowel sounds are normal. There is no distension.      Palpations: Abdomen is soft.      Tenderness: There is generalized abdominal tenderness. There is guarding. There is no right CVA tenderness, left CVA tenderness or rebound. Negative signs include Schroeder's sign, Rovsing's sign and McBurney's sign.      Hernia: No hernia is present.   Genitourinary:     Penis: Normal.       Testes: Normal.         Right: Mass, tenderness or swelling not present.         Left: Mass, tenderness or swelling not present.      Rectum: Normal. No mass or tenderness.      Comments: Not enough stool sample obtained to perform Hemoccult.  No hemorrhoids or fissures.  No stool felt in rectal vault.  Musculoskeletal:         General: No signs of injury. Normal range of motion.      Cervical back: Normal range of motion and neck supple. No rigidity.   Skin:     General: Skin is warm.      Capillary Refill: Capillary refill takes less than 2 seconds.      Coloration: Skin is not pale.      Findings: No erythema.   Neurological:      General: No focal deficit present.      Mental Status: He is alert and oriented to person, place, and time. Mental status is at baseline.      Motor: No weakness.      Gait: Gait normal.   Psychiatric:         Mood and Affect: Mood normal.         Behavior: Behavior normal.         Results Reviewed       Procedure Component Value  Units Date/Time    Comprehensive metabolic panel [[id number]]  (Abnormal) Collected: 10/12/24 0200    Lab Status: Final result Specimen: Blood from Arm, Right Updated: 10/12/24 0227     Sodium 136 mmol/L      Potassium 3.9 mmol/L      Chloride 104 mmol/L      CO2 20 mmol/L      ANION GAP 12 mmol/L      BUN 15 mg/dL      Creatinine 1.14 mg/dL      Glucose 102 mg/dL      Calcium 9.4 mg/dL      AST 12 U/L      ALT 13 U/L      Alkaline Phosphatase 42 U/L      Total Protein 6.9 g/dL      Albumin 4.8 g/dL      Total Bilirubin 0.65 mg/dL      eGFR 82 ml/min/1.73sq m     Narrative:      [employer] guidelines for Chronic Kidney Disease (CKD):     Stage 1 with normal or high GFR (GFR > 90 mL/min/1.73 square meters)    Stage 2 Mild CKD (GFR = 60-89 mL/min/1.73 square meters)    Stage 3A Moderate CKD (GFR = 45-59 mL/min/1.73 square meters)    Stage 3B Moderate CKD (GFR = 30-44 mL/min/1.73 square meters)    Stage 4 Severe CKD (GFR = 15-29 mL/min/1.73 square meters)    Stage 5 End Stage CKD (GFR <15 mL/min/1.73 square meters)  Note: GFR calculation is accurate only with a steady state creatinine    Lipase [[id number]]  (Normal) Collected: 10/12/24 0200    Lab Status: Final result Specimen: Blood from Arm, Right Updated: 10/12/24 0227     Lipase 38 u/L     Lactic acid, plasma (w/reflex if result > 2.0) [[id number]]  (Normal) Collected: 10/12/24 0200    Lab Status: Final result Specimen: Blood from Arm, Right Updated: 10/12/24 0226     LACTIC ACID 0.5 mmol/L     Narrative:      Result may be elevated if tourniquet was used during collection.    CBC and differential [[id number]] Collected: 10/12/24 0200    Lab Status: Final result Specimen: Blood from Arm, Right Updated: 10/12/24 0212     WBC 7.84 Thousand/uL      RBC 4.75 Million/uL      Hemoglobin 13.5 g/dL      Hematocrit 39.0 %      MCV 82 fL      MCH 28.4 pg      MCHC 34.6 g/dL      RDW 12.2 %      MPV 9.3 fL      Platelets 265 Thousands/uL      nRBC 0 /100  WBCs      Segmented % 54 %      Immature Grans % 0 %      Lymphocytes % 37 %      Monocytes % 6 %      Eosinophils Relative 2 %      Basophils Relative 1 %      Absolute Neutrophils 4.24 Thousands/µL      Absolute Immature Grans 0.03 Thousand/uL      Absolute Lymphocytes 2.89 Thousands/µL      Absolute Monocytes 0.46 Thousand/µL      Eosinophils Absolute 0.18 Thousand/µL      Basophils Absolute 0.04 Thousands/µL             CT abdomen pelvis with contrast   Final Interpretation by Vivek Sullivan DO (10/12 0434)      No CT evidence of acute pathology or significant change.         Workstation performed: [id number]             Procedures    ED Medication and Procedure Management   Prior to Admission Medications   Prescriptions Last Dose Informant Patient Reported? Taking?   albuterol (PROVENTIL HFA,VENTOLIN HFA) 90 mcg/act inhaler   No No   Sig: Inhale 2 puffs 4 (four) times a day   ondansetron (ZOFRAN) 4 mg tablet   No No   Sig: Take 1 tablet (4 mg total) by mouth every 8 (eight) hours as needed for nausea or vomiting for up to 3 days      Facility-Administered Medications: None     Patient's Medications   Discharge Prescriptions    No medications on file     No discharge procedures on file.  ED SEPSIS DOCUMENTATION   Time reflects when diagnosis was documented in both MDM as applicable and the Disposition within this note       Time User Action Codes Description Comment    10/12/2024  5:37 AM Meka De Anda Add [R10.9] Intractable abdominal pain     10/12/2024  5:38 AM Meka De Anda Add [R82.4] Urine ketones                  Meka De Anda PA-C  10/12/24 0559

## 2024-10-13 LAB
ALBUMIN SERPL BCG-MCNC: 4.5 G/DL (ref 3.5–5)
ALP SERPL-CCNC: 38 U/L (ref 34–104)
ALT SERPL W P-5'-P-CCNC: 10 U/L (ref 7–52)
ANION GAP SERPL CALCULATED.3IONS-SCNC: 6 MMOL/L (ref 4–13)
AST SERPL W P-5'-P-CCNC: 11 U/L (ref 13–39)
BILIRUB SERPL-MCNC: 0.74 MG/DL (ref 0.2–1)
BUN SERPL-MCNC: 8 MG/DL (ref 5–25)
CALCIUM SERPL-MCNC: 9.3 MG/DL (ref 8.4–10.2)
CHLORIDE SERPL-SCNC: 106 MMOL/L (ref 96–108)
CO2 SERPL-SCNC: 27 MMOL/L (ref 21–32)
CREAT SERPL-MCNC: 0.98 MG/DL (ref 0.6–1.3)
ERYTHROCYTE [DISTWIDTH] IN BLOOD BY AUTOMATED COUNT: 12 % (ref 11.6–15.1)
GFR SERPL CREATININE-BSD FRML MDRD: 98 ML/MIN/1.73SQ M
GLUCOSE P FAST SERPL-MCNC: 85 MG/DL (ref 65–99)
GLUCOSE SERPL-MCNC: 85 MG/DL (ref 65–140)
HCT VFR BLD AUTO: 40 % (ref 36.5–49.3)
HGB BLD-MCNC: 13.3 G/DL (ref 12–17)
MCH RBC QN AUTO: 28.4 PG (ref 26.8–34.3)
MCHC RBC AUTO-ENTMCNC: 33.3 G/DL (ref 31.4–37.4)
MCV RBC AUTO: 85 FL (ref 82–98)
PLATELET # BLD AUTO: 213 THOUSANDS/UL (ref 149–390)
PMV BLD AUTO: 9.4 FL (ref 8.9–12.7)
POTASSIUM SERPL-SCNC: 4.2 MMOL/L (ref 3.5–5.3)
PROT SERPL-MCNC: 6.5 G/DL (ref 6.4–8.4)
RBC # BLD AUTO: 4.69 MILLION/UL (ref 3.88–5.62)
SODIUM SERPL-SCNC: 139 MMOL/L (ref 135–147)
WBC # BLD AUTO: 4.9 THOUSAND/UL (ref 4.31–10.16)

## 2024-10-13 PROCEDURE — 99204 OFFICE O/P NEW MOD 45 MIN: CPT | Performed by: INTERNAL MEDICINE

## 2024-10-13 PROCEDURE — 85027 COMPLETE CBC AUTOMATED: CPT

## 2024-10-13 PROCEDURE — 87505 NFCT AGENT DETECTION GI: CPT

## 2024-10-13 PROCEDURE — 87209 SMEAR COMPLEX STAIN: CPT

## 2024-10-13 PROCEDURE — 99232 SBSQ HOSP IP/OBS MODERATE 35: CPT | Performed by: INTERNAL MEDICINE

## 2024-10-13 PROCEDURE — 80053 COMPREHEN METABOLIC PANEL: CPT

## 2024-10-13 PROCEDURE — 87177 OVA AND PARASITES SMEARS: CPT

## 2024-10-13 RX ORDER — BISACODYL 5 MG/1
10 TABLET, DELAYED RELEASE ORAL ONCE
Status: COMPLETED | OUTPATIENT
Start: 2024-10-13 | End: 2024-10-13

## 2024-10-13 RX ORDER — SODIUM CHLORIDE, SODIUM GLUCONATE, SODIUM ACETATE, POTASSIUM CHLORIDE, MAGNESIUM CHLORIDE, SODIUM PHOSPHATE, DIBASIC, AND POTASSIUM PHOSPHATE .53; .5; .37; .037; .03; .012; .00082 G/100ML; G/100ML; G/100ML; G/100ML; G/100ML; G/100ML; G/100ML
100 INJECTION, SOLUTION INTRAVENOUS CONTINUOUS
Status: DISCONTINUED | OUTPATIENT
Start: 2024-10-13 | End: 2024-10-14 | Stop reason: HOSPADM

## 2024-10-13 RX ORDER — DOCUSATE SODIUM 100 MG/1
100 CAPSULE, LIQUID FILLED ORAL 2 TIMES DAILY
Status: DISCONTINUED | OUTPATIENT
Start: 2024-10-13 | End: 2024-10-14 | Stop reason: HOSPADM

## 2024-10-13 RX ORDER — POLYETHYLENE GLYCOL 3350 17 G/17G
17 POWDER, FOR SOLUTION ORAL 2 TIMES DAILY
Status: DISCONTINUED | OUTPATIENT
Start: 2024-10-13 | End: 2024-10-14 | Stop reason: HOSPADM

## 2024-10-13 RX ADMIN — DICYCLOMINE HYDROCHLORIDE 10 MG: 10 CAPSULE ORAL at 11:53

## 2024-10-13 RX ADMIN — DICYCLOMINE HYDROCHLORIDE 10 MG: 10 CAPSULE ORAL at 17:01

## 2024-10-13 RX ADMIN — SUCRALFATE 1 G: 1 TABLET ORAL at 17:01

## 2024-10-13 RX ADMIN — ACETAMINOPHEN 975 MG: 325 TABLET ORAL at 14:06

## 2024-10-13 RX ADMIN — SODIUM CHLORIDE, SODIUM GLUCONATE, SODIUM ACETATE, POTASSIUM CHLORIDE, MAGNESIUM CHLORIDE, SODIUM PHOSPHATE, DIBASIC, AND POTASSIUM PHOSPHATE 100 ML/HR: .53; .5; .37; .037; .03; .012; .00082 INJECTION, SOLUTION INTRAVENOUS at 23:50

## 2024-10-13 RX ADMIN — DICYCLOMINE HYDROCHLORIDE 10 MG: 10 CAPSULE ORAL at 06:21

## 2024-10-13 RX ADMIN — SUCRALFATE 1 G: 1 TABLET ORAL at 06:21

## 2024-10-13 RX ADMIN — POLYETHYLENE GLYCOL 3350 17 G: 17 POWDER, FOR SOLUTION ORAL at 17:01

## 2024-10-13 RX ADMIN — SUCRALFATE 1 G: 1 TABLET ORAL at 21:17

## 2024-10-13 RX ADMIN — SUCRALFATE 1 G: 1 TABLET ORAL at 11:53

## 2024-10-13 RX ADMIN — OXYCODONE HYDROCHLORIDE 5 MG: 5 TABLET ORAL at 05:31

## 2024-10-13 RX ADMIN — DOCUSATE SODIUM 100 MG: 100 CAPSULE, LIQUID FILLED ORAL at 11:52

## 2024-10-13 RX ADMIN — DICYCLOMINE HYDROCHLORIDE 10 MG: 10 CAPSULE ORAL at 21:17

## 2024-10-13 RX ADMIN — OXYCODONE HYDROCHLORIDE 5 MG: 5 TABLET ORAL at 17:56

## 2024-10-13 RX ADMIN — DOCUSATE SODIUM 100 MG: 100 CAPSULE, LIQUID FILLED ORAL at 17:04

## 2024-10-13 RX ADMIN — SODIUM CHLORIDE, SODIUM GLUCONATE, SODIUM ACETATE, POTASSIUM CHLORIDE, MAGNESIUM CHLORIDE, SODIUM PHOSPHATE, DIBASIC, AND POTASSIUM PHOSPHATE 100 ML/HR: .53; .5; .37; .037; .03; .012; .00082 INJECTION, SOLUTION INTRAVENOUS at 11:54

## 2024-10-13 RX ADMIN — ACETAMINOPHEN 975 MG: 325 TABLET ORAL at 21:17

## 2024-10-13 RX ADMIN — SODIUM CHLORIDE, SODIUM GLUCONATE, SODIUM ACETATE, POTASSIUM CHLORIDE, MAGNESIUM CHLORIDE, SODIUM PHOSPHATE, DIBASIC, AND POTASSIUM PHOSPHATE 100 ML/HR: .53; .5; .37; .037; .03; .012; .00082 INJECTION, SOLUTION INTRAVENOUS at 02:45

## 2024-10-13 RX ADMIN — BISACODYL 10 MG: 5 TABLET, COATED ORAL at 14:06

## 2024-10-13 RX ADMIN — HYDROMORPHONE HYDROCHLORIDE 0.5 MG: 1 INJECTION, SOLUTION INTRAMUSCULAR; INTRAVENOUS; SUBCUTANEOUS at 19:49

## 2024-10-13 RX ADMIN — PANTOPRAZOLE SODIUM 40 MG: 40 TABLET, DELAYED RELEASE ORAL at 05:31

## 2024-10-13 RX ADMIN — ACETAMINOPHEN 975 MG: 325 TABLET ORAL at 05:31

## 2024-10-13 NOTE — CONSULTS
Consultation -  [facility]  Neo Iwonaisela 38 y.o. male MRN: [id number]  Unit/Bed#: S -01 Encounter: [id number]        Inpatient consult to gastroenterology  Consult performed by: Nathaniel Marsh MD  Consult ordered by: Tere Arredondo MD          Reason for Consult / Principal Problem:     Abdominal pain and constipation      ASSESSMENT AND PLAN:      Abdominal pain and constipation: He most likely had an infectious gastroenteritis a few weeks ago complicated by postinfectious irritable bowel syndrome.  While he initially had diarrhea he seems to have predominantly constipation now and this is likely exacerbated by his opioid analgesics.  He is agreeable to stopping all opioid pain medications.  We will also give him a dose of Dulcolax 10 mg and start MiraLAX twice a day.  If his pain resolves as his constipation is managed, then no further evaluation is needed.  If his pain persist then we we will consider further evaluation which could include endoscopy and colonoscopy.  We will also treat empirically with Protonix to see if this helps his pain.    ______________________________________________________________________    HPI: He developed acute onset of abdominal pain with mild diarrhea followed by constipation.  His initial diarrhea resolved but his constipation worsened and his abdominal pain worsened.  He also had some nausea and vomiting but that part appears to have her proved.  He came to the emergency room for evaluation 2 days ago and had an unremarkable CT scan.  After being discharged she returned to the hospital yesterday with similar symptoms and again had an unremarkable CT scan.  He said he has been having hard bowel movements and denies any bleeding or weight loss.  He has received Dilaudid and oxycodone for his pain and received magnesium citrate for his constipation.  This led to loose stools.  He is also taken a few doses of MiraLAX but feels these have not helped.    He  has never previously had an upper endoscopy or colonoscopy.  He has a family history of Crohn's disease but no family history of colon polyps or colon cancer.      REVIEW OF SYSTEMS:    CONSTITUTIONAL: Denies any fever, chills, rigors, and weight loss.  HEENT: No earache or tinnitus. Denies hearing loss or visual disturbances.  CARDIOVASCULAR: No chest pain or palpitations.   RESPIRATORY: Denies any cough, hemoptysis, shortness of breath or dyspnea on exertion.  GASTROINTESTINAL: As noted in the History of Present Illness.   GENITOURINARY: No problems with urination. Denies any hematuria or dysuria.  NEUROLOGIC: No dizziness or vertigo, denies headaches.   MUSCULOSKELETAL: Denies any muscle or joint pain.   SKIN: Denies skin rashes or itching.   ENDOCRINE: Denies excessive thirst. Denies intolerance to heat or cold.  PSYCHOSOCIAL: Denies depression or anxiety. Denies any recent memory loss.       Historical Information   Past Medical History:   Diagnosis Date    Asthma     Eczema     History of chickenpox      Past Surgical History:   Procedure Laterality Date    CIRCUMCISION      WISDOM TOOTH EXTRACTION       Social History   Social History     Substance and Sexual Activity   Alcohol Use Yes    Comment: socially      Social History     Substance and Sexual Activity   Drug Use Yes    Types: Marijuana     Social History     Tobacco Use   Smoking Status Former    Current packs/day: 0.00    Types: Cigarettes    Quit date:     Years since quittin.7   Smokeless Tobacco Never   Tobacco Comments    social smoking-couple cigarettes a week      Family History   Problem Relation Age of Onset    No Known Problems Mother     Hypertension Father     Crohn's disease Brother     No Known Problems Daughter        Meds/Allergies       Medications Prior to Admission:     ondansetron (ZOFRAN) 4 mg tablet    albuterol (PROVENTIL HFA,VENTOLIN HFA) 90 mcg/act inhaler    Current Facility-Administered Medications:      acetaminophen (TYLENOL) tablet 975 mg, Q8H    albuterol (PROVENTIL HFA,VENTOLIN HFA) inhaler 2 puff, 4x Daily    aluminum-magnesium hydroxide-simethicone (MAALOX) oral suspension 30 mL, Q4H PRN    dicyclomine (BENTYL) capsule 10 mg, 4x Daily (AC & HS)    docusate sodium (COLACE) capsule 100 mg, BID    HYDROmorphone (DILAUDID) injection 0.5 mg, Q4H PRN    multi-electrolyte (PLASMALYTE-A/ISOLYTE-S PH 7.4) IV solution, Continuous, Last Rate: 100 mL/hr (10/13/24 1154)    oxyCODONE (ROXICODONE) IR tablet 5 mg, Q6H PRN    pantoprazole (PROTONIX) EC tablet 40 mg, Early Morning    polyethylene glycol (MIRALAX) packet 17 g, BID    sucralfate (CARAFATE) tablet 1 g, 4x Daily (AC & HS)    Allergies   Allergen Reactions    Pollen Extract Allergic Rhinitis           Objective     Blood pressure 131/94, pulse 63, temperature 98.3 °F (36.8 °C), resp. rate 18, height 6' (1.829 m), weight 94.8 kg (209 lb), SpO2 97%. Body mass index is 28.35 kg/m².      Intake/Output Summary (Last 24 hours) at 10/13/2024 1520  Last data filed at 10/13/2024 0726  Gross per 24 hour   Intake 240 ml   Output --   Net 240 ml         PHYSICAL EXAM:      General Appearance:   Alert, cooperative, no distress   HEENT:   Normocephalic, atraumatic, anicteric.     Neck:  Supple, symmetrical, trachea midline   Lungs:   Clear to auscultation bilaterally; no rales, rhonchi or wheezing; respirations unlabored    Heart::   Regular rate and rhythm; no murmur, rub, or gallop.   Abdomen:   Soft, mild lower abdominal tenderness, non-distended; normal bowel sounds; no masses, no organomegaly    Genitalia:   Deferred    Rectal:   Deferred    Extremities:  No cyanosis, clubbing or edema    Pulses:  2+ and symmetric all extremities    Skin:  No jaundice, rashes, or lesions    Lymph nodes:  No palpable cervical lymphadenopathy        Lab Results:   Admission on 10/12/2024   Component Date Value    WBC 10/12/2024 7.84     RBC 10/12/2024 4.75     Hemoglobin 10/12/2024 13.5      Hematocrit 10/12/2024 39.0     MCV 10/12/2024 82     MCH 10/12/2024 28.4     MCHC 10/12/2024 34.6     RDW 10/12/2024 12.2     MPV 10/12/2024 9.3     Platelets 10/12/2024 265     nRBC 10/12/2024 0     Segmented % 10/12/2024 54     Immature Grans % 10/12/2024 0     Lymphocytes % 10/12/2024 37     Monocytes % 10/12/2024 6     Eosinophils Relative 10/12/2024 2     Basophils Relative 10/12/2024 1     Absolute Neutrophils 10/12/2024 4.24     Absolute Immature Grans 10/12/2024 0.03     Absolute Lymphocytes 10/12/2024 2.89     Absolute Monocytes 10/12/2024 0.46     Eosinophils Absolute 10/12/2024 0.18     Basophils Absolute 10/12/2024 0.04     Sodium 10/12/2024 136     Potassium 10/12/2024 3.9     Chloride 10/12/2024 104     CO2 10/12/2024 20 (L)     ANION GAP 10/12/2024 12     BUN 10/12/2024 15     Creatinine 10/12/2024 1.14     Glucose 10/12/2024 102     Calcium 10/12/2024 9.4     AST 10/12/2024 12 (L)     ALT 10/12/2024 13     Alkaline Phosphatase 10/12/2024 42     Total Protein 10/12/2024 6.9     Albumin 10/12/2024 4.8     Total Bilirubin 10/12/2024 0.65     eGFR 10/12/2024 82     LACTIC ACID 10/12/2024 0.5     Lipase 10/12/2024 38     TSH 3RD GENERATON 10/12/2024 1.427     Sodium 10/12/2024 136     Potassium 10/12/2024 4.2     Chloride 10/12/2024 107     CO2 10/12/2024 24     ANION GAP 10/12/2024 5     BUN 10/12/2024 12     Creatinine 10/12/2024 1.01     Glucose 10/12/2024 114     Calcium 10/12/2024 8.9     AST 10/12/2024 11 (L)     ALT 10/12/2024 11     Alkaline Phosphatase 10/12/2024 38     Total Protein 10/12/2024 6.2 (L)     Albumin 10/12/2024 4.3     Total Bilirubin 10/12/2024 0.63     eGFR 10/12/2024 95     WBC 10/12/2024 5.64     RBC 10/12/2024 4.47     Hemoglobin 10/12/2024 12.9     Hematocrit 10/12/2024 37.4     MCV 10/12/2024 84     MCH 10/12/2024 28.9     MCHC 10/12/2024 34.5     RDW 10/12/2024 12.3     MPV 10/12/2024 8.9     Platelets 10/12/2024 200     nRBC 10/12/2024 0     Segmented % 10/12/2024 54      Immature Grans % 10/12/2024 0     Lymphocytes % 10/12/2024 36     Monocytes % 10/12/2024 6     Eosinophils Relative 10/12/2024 3     Basophils Relative 10/12/2024 1     Absolute Neutrophils 10/12/2024 3.05     Absolute Immature Grans 10/12/2024 0.01     Absolute Lymphocytes 10/12/2024 2.02     Absolute Monocytes 10/12/2024 0.36     Eosinophils Absolute 10/12/2024 0.17     Basophils Absolute 10/12/2024 0.03     Sed Rate 10/12/2024 2     CRP 10/12/2024 <1.0     Sodium 10/13/2024 139     Potassium 10/13/2024 4.2     Chloride 10/13/2024 106     CO2 10/13/2024 27     ANION GAP 10/13/2024 6     BUN 10/13/2024 8     Creatinine 10/13/2024 0.98     Glucose 10/13/2024 85     Glucose, Fasting 10/13/2024 85     Calcium 10/13/2024 9.3     AST 10/13/2024 11 (L)     ALT 10/13/2024 10     Alkaline Phosphatase 10/13/2024 38     Total Protein 10/13/2024 6.5     Albumin 10/13/2024 4.5     Total Bilirubin 10/13/2024 0.74     eGFR 10/13/2024 98     WBC 10/13/2024 4.90     RBC 10/13/2024 4.69     Hemoglobin 10/13/2024 13.3     Hematocrit 10/13/2024 40.0     MCV 10/13/2024 85     MCH 10/13/2024 28.4     MCHC 10/13/2024 33.3     RDW 10/13/2024 12.0     Platelets 10/13/2024 213     MPV 10/13/2024 9.4        Imaging Studies: I have personally reviewed pertinent imaging studies.

## 2024-10-13 NOTE — QUICK NOTE
"1707: Informed that patient was drinking well, with adequate oral liquid intake.  Contacted by RN regarding IV fluids.reviewed patient's electrolytes as within normal limits.  Given that patient was taking in adequate liquids with no electrolyte abnormalities and no current diarrhea, discontinued IV fluids    1828: Informed by RN that patient was having increasing abdominal pain and cramping, with associated loose/watery stools.  Per RN, patient felt \"lightheaded and shaky\" patient was administered oxycodone as needed with heating pack to place over abdomen.    Although patient denies sick contacts and recent travel, given patient's prolonged diarrheal illness as a previously healthy person, will obtain stool enteric pathogen panel and stool and ova parasite to rule out infectious causes.  Resumed IV fluids  "

## 2024-10-13 NOTE — PROGRESS NOTES
Progress Note - Hospitalist   Name: Neo Vides 38 y.o. male I MRN: [id number]  Unit/Bed#: S -01 I Date of Admission: 10/12/2024   Date of Service: 10/13/2024 I Hospital Day: 0    Assessment & Plan  Intractable abdominal pain  Patient presents with intractable abdominal pain was seen yesterday in the ED for similar.  Reports abdominal pain has been going on for the past several weeks worsening over the past couple of days with associated nausea, vomiting, chills.  Patient denies any diarrhea, sick contacts, or recent travel.  Originally was felt to be secondary to constipation he received multiple laxatives in the ED without relief of symptoms.  Patient still having intractable abdominal pain and abdominal cramping. Last normal BM was on 10/10  10/11 CT abdomen without acute intra-abdominal pathology  10/12 repeat CT without any acute changes.  CMP, lipase WNL.  No signs of acute infectious process seen not meeting SIRS criteria.  Suspect abdominal pain is multifactorial possible abdominal cramping from recent laxative intake vs gastritis vs GERD vs gastroenteritis vs constipation   10/13 - Patient's report he has 1 semisolid  and 1 liquid stool overnight.  Patient continues to have abdominal pain and required oxycodone around 5 AM this morning.    Plan  Will initiate patient on Protonix, Bentyl, Carafate  As needed antinausea medication ordered  Limit amount of narcotics as this may contribute to worsening constipation  Clear liquid diet advance as tolerated  Multimodal pain regimen  GI consulted, rec appreciated  Asthma  Does not appear in acute exacerbation at this time  Continue as needed albuterol    VTE Pharmacologic Prophylaxis:   Low Risk (Score 0-2) - Encourage Ambulation.    Mobility:   Basic Mobility Inpatient Raw Score: 24  JH-HLM Goal: 8: Walk 250 feet or more  JH-HLM Achieved: 8: Walk 250 feet ot more  JH-HLM Goal achieved. Continue to encourage appropriate mobility.    Patient Centered  Rounds: I performed bedside rounds with nursing staff today.   Discussions with Specialists or Other Care Team Provider: GI    Education and Discussions with Family / Patient: Updated  (wife) via phone.    Current Length of Stay: 0 day(s)  Current Patient Status: Observation   Certification Statement: The patient will continue to require additional inpatient hospital stay due to intractable abdominal pain  Discharge Plan: Anticipate discharge in 24-48 hrs to home.    Code Status: Level 1 - Full Code    Subjective   Patient reports that he has 1 semisolid bowel movement and 1 liquid bowel movement overnight.  Patient still report abdominal pain to require oxycodone around 5 AM.  Patient is denying chest pain, shortness of breath, fever, chill, bloody stool or changes with urination.  GI consulted and rec appreciated.    Objective :  Temp:  [97.4 °F (36.3 °C)-98.2 °F (36.8 °C)] 98.2 °F (36.8 °C)  HR:  [60-63] 60  BP: (105-112)/(64-70) 105/64  Resp:  [16] 16  SpO2:  [96 %-97 %] 96 %    Body mass index is 28.35 kg/m².     Input and Output Summary (last 24 hours):     Intake/Output Summary (Last 24 hours) at 10/13/2024 1157  Last data filed at 10/13/2024 0726  Gross per 24 hour   Intake 240 ml   Output --   Net 240 ml       Physical Exam  Constitutional:       Appearance: Normal appearance. He is normal weight.   HENT:      Mouth/Throat:      Mouth: Mucous membranes are moist.   Eyes:      General:         Right eye: No discharge.         Left eye: No discharge.      Extraocular Movements: Extraocular movements intact.      Conjunctiva/sclera: Conjunctivae normal.   Cardiovascular:      Rate and Rhythm: Normal rate and regular rhythm.      Pulses: Normal pulses.      Heart sounds: No murmur heard.     No gallop.   Pulmonary:      Effort: Pulmonary effort is normal. No respiratory distress.      Breath sounds: Normal breath sounds. No wheezing.   Abdominal:      General: Abdomen is flat. Bowel sounds are  normal. There is no distension.      Palpations: Abdomen is soft.      Tenderness: There is abdominal tenderness (Bilateral lower quadrant discomfort). There is no right CVA tenderness, left CVA tenderness or guarding.   Musculoskeletal:         General: Normal range of motion.      Cervical back: Normal range of motion.      Right lower leg: No edema.      Left lower leg: No edema.   Skin:     General: Skin is warm.      Capillary Refill: Capillary refill takes less than 2 seconds.   Neurological:      General: No focal deficit present.      Mental Status: He is alert and oriented to person, place, and time. Mental status is at baseline.   Psychiatric:         Mood and Affect: Mood normal.         Behavior: Behavior normal.         Thought Content: Thought content normal.         Judgment: Judgment normal.           Lines/Drains:              Lab Results: I have reviewed the following results:   Results from last 7 days   Lab Units 10/13/24  0434 10/12/24  0650   WBC Thousand/uL 4.90 5.64   HEMOGLOBIN g/dL 13.3 12.9   HEMATOCRIT % 40.0 37.4   PLATELETS Thousands/uL 213 200   SEGS PCT %  --  54   LYMPHO PCT %  --  36   MONO PCT %  --  6   EOS PCT %  --  3     Results from last 7 days   Lab Units 10/13/24  0434   SODIUM mmol/L 139   POTASSIUM mmol/L 4.2   CHLORIDE mmol/L 106   CO2 mmol/L 27   BUN mg/dL 8   CREATININE mg/dL 0.98   ANION GAP mmol/L 6   CALCIUM mg/dL 9.3   ALBUMIN g/dL 4.5   TOTAL BILIRUBIN mg/dL 0.74   ALK PHOS U/L 38   ALT U/L 10   AST U/L 11*   GLUCOSE RANDOM mg/dL 85                 Results from last 7 days   Lab Units 10/12/24  0200 10/11/24  0420   LACTIC ACID mmol/L 0.5 0.7       Recent Cultures (last 7 days):         Imaging Results Review: No pertinent imaging studies reviewed.  Other Study Results Review: No additional pertinent studies reviewed.    Last 24 Hours Medication List:     Current Facility-Administered Medications:     acetaminophen (TYLENOL) tablet 975 mg, Q8H    albuterol  (PROVENTIL HFA,VENTOLIN HFA) inhaler 2 puff, 4x Daily    aluminum-magnesium hydroxide-simethicone (MAALOX) oral suspension 30 mL, Q4H PRN    dicyclomine (BENTYL) capsule 10 mg, 4x Daily (AC & HS)    docusate sodium (COLACE) capsule 100 mg, BID    HYDROmorphone (DILAUDID) injection 0.5 mg, Q4H PRN    multi-electrolyte (PLASMALYTE-A/ISOLYTE-S PH 7.4) IV solution, Continuous, Last Rate: 100 mL/hr (10/13/24 5444)    oxyCODONE (ROXICODONE) IR tablet 5 mg, Q6H PRN    pantoprazole (PROTONIX) EC tablet 40 mg, Early Morning    sucralfate (CARAFATE) tablet 1 g, 4x Daily (AC & HS)    Administrative Statements   Today, Patient Was Seen By: Tere Arredondo MD  I have spent a total time of 30 minutes in caring for this patient on the day of the visit/encounter including .    **Please Note: This note may have been constructed using a voice recognition system.**

## 2024-10-13 NOTE — PLAN OF CARE
Problem: PAIN - ADULT  Goal: Verbalizes/displays adequate comfort level or baseline comfort level  Description: Interventions:  - Encourage patient to monitor pain and request assistance  - Assess pain using appropriate pain scale  - Administer analgesics based on type and severity of pain and evaluate response  - Implement non-pharmacological measures as appropriate and evaluate response  - Consider cultural and social influences on pain and pain management  - Notify physician/advanced practitioner if interventions unsuccessful or patient reports new pain  Outcome: Progressing     Problem: INFECTION - ADULT  Goal: Absence or prevention of progression during hospitalization  Description: INTERVENTIONS:  - Assess and monitor for signs and symptoms of infection  - Monitor lab/diagnostic results  - Monitor all insertion sites, i.e. indwelling lines, tubes, and drains  - Monitor endotracheal if appropriate and nasal secretions for changes in amount and color  - [place] appropriate cooling/warming therapies per order  - Administer medications as ordered  - Instruct and encourage patient and family to use good hand hygiene technique  - Identify and instruct in appropriate isolation precautions for identified infection/condition  Outcome: Progressing  Goal: Absence of fever/infection during neutropenic period  Description: INTERVENTIONS:  - Monitor WBC    Outcome: Progressing     Problem: SAFETY ADULT  Goal: Patient will remain free of falls  Description: INTERVENTIONS:  - Educate patient/family on patient safety including physical limitations  - Instruct patient to call for assistance with activity   - Consult OT/PT to assist with strengthening/mobility   - Keep Call bell within reach  - Keep bed low and locked with side rails adjusted as appropriate  - Keep care items and personal belongings within reach  - Initiate and maintain comfort rounds  - Make Fall Risk Sign visible to staff  - Offer Toileting every  Hours,  in advance of need  - Initiate/Maintain alarm  - Obtain necessary fall risk management equipment:   - Apply yellow socks and bracelet for high fall risk patients  - Consider moving patient to room near nurses station  Outcome: Progressing  Goal: Maintain or return to baseline ADL function  Description: INTERVENTIONS:  -  Assess patient's ability to carry out ADLs; assess patient's baseline for ADL function and identify physical deficits which impact ability to perform ADLs (bathing, care of mouth/teeth, toileting, grooming, dressing, etc.)  - Assess/evaluate cause of self-care deficits   - Assess range of motion  - Assess patient's mobility; develop plan if impaired  - Assess patient's need for assistive devices and provide as appropriate  - Encourage maximum independence but intervene and supervise when necessary  - Involve family in performance of ADLs  - Assess for home care needs following discharge   - Consider OT consult to assist with ADL evaluation and planning for discharge  - Provide patient education as appropriate  Outcome: Progressing  Goal: Maintains/Returns to pre admission functional level  Description: INTERVENTIONS:  - Perform AM-PAC 6 Click Basic Mobility/ Daily Activity assessment daily.  - Set and communicate daily mobility goal to care team and patient/family/caregiver.   - Collaborate with rehabilitation services on mobility goals if consulted  - Perform Range of Motion  times a day.  - Reposition patient every  hours.  - Dangle patient  times a day  - Stand patient  times a day  - Ambulate patient  times a day  - Out of bed to chair  times a day   - Out of bed for meals  times a day  - Out of bed for toileting  - Record patient progress and toleration of activity level   Outcome: Progressing     Problem: DISCHARGE PLANNING  Goal: Discharge to home or other facility with appropriate resources  Description: INTERVENTIONS:  - Identify barriers to discharge w/patient and caregiver  - Arrange for  needed discharge resources and transportation as appropriate  - Identify discharge learning needs (meds, wound care, etc.)  - Arrange for interpretive services to assist at discharge as needed  - Refer to Case Management Department for coordinating discharge planning if the patient needs post-hospital services based on physician/advanced practitioner order or complex needs related to functional status, cognitive ability, or social support system  Outcome: Progressing     Problem: Knowledge Deficit  Goal: Patient/family/caregiver demonstrates understanding of disease process, treatment plan, medications, and discharge instructions  Description: Complete learning assessment and assess knowledge base.  Interventions:  - Provide teaching at level of understanding  - Provide teaching via preferred learning methods  Outcome: Progressing     Problem: GASTROINTESTINAL - ADULT  Goal: Minimal or absence of nausea and/or vomiting  Description: INTERVENTIONS:  - Administer IV fluids if ordered to ensure adequate hydration  - Maintain NPO status until nausea and vomiting are resolved  - Nasogastric tube if ordered  - Administer ordered antiemetic medications as needed  - Provide nonpharmacologic comfort measures as appropriate  - Advance diet as tolerated, if ordered  - Consider nutrition services referral to assist patient with adequate nutrition and appropriate food choices  Outcome: Progressing  Goal: Maintains or returns to baseline bowel function  Description: INTERVENTIONS:  - Assess bowel function  - Encourage oral fluids to ensure adequate hydration  - Administer IV fluids if ordered to ensure adequate hydration  - Administer ordered medications as needed  - Encourage mobilization and activity  - Consider nutritional services referral to assist patient with adequate nutrition and appropriate food choices  Outcome: Progressing  Goal: Maintains adequate nutritional intake  Description: INTERVENTIONS:  - Monitor percentage  of each meal consumed  - Identify factors contributing to decreased intake, treat as appropriate  - Assist with meals as needed  - Monitor I&O, weight, and lab values if indicated  - Obtain nutrition services referral as needed  Outcome: Progressing  Goal: Oral mucous membranes remain intact  Description: INTERVENTIONS  - Assess oral mucosa and hygiene practices  - Implement preventative oral hygiene regimen  - Implement oral medicated treatments as ordered  - Initiate Nutrition services referral as needed  Outcome: Progressing

## 2024-10-13 NOTE — UTILIZATION REVIEW
Initial Clinical Review    Admission: Date/Time/Statement:   Admission Orders (From admission, onward)       Ordered        10/12/24 0539  Place in Observation  Once                          Orders Placed This Encounter   Procedures    Place in Observation     Standing Status:   Standing     Number of Occurrences:   1     Order Specific Question:   Level of Care     Answer:   Med Surg [16]     ED Arrival Information       Expected   -    Arrival   10/12/2024 01:20    Acuity   Urgent              Means of arrival   Walk-In    Escorted by   Spouse    Service   Hospitalist    Admission type   Emergency              Arrival complaint   Abdominal pain             Chief Complaint   Patient presents with    Abdominal Pain     Patient presents with severe abdominal cramping. States he was here last night, given mag citrate. States he had liquid BM but now back to having the cramping. Feeling lightheaded now.        Initial Presentation: 38 y.o. male with a PMH of asthma who presents with abdominal pain. Patient recently seen on 10/11 for similar. Patient reports ongoing abdominal pain over the past couple of days with change of bowel habits, nausea, vomiting, and chills. He reports recent GI bug. During initial evaluation on 10/11 and was treated for constipation. CT imaging did not show significant stool burden and was otherwise unremarkable. He was given magnesium citrate, antiemetic, and pain medication with improvement in symptoms. He returns today with worsening abdominal cramping, feeling of tenesmus, nausea, and worsening abdominal pain. Workup thus far has been unrevealing. On exam patient is visibly uncomfortable reporting generalized abdominal pain/tenderness. He denies any palliating or provoking factors. He states that he has been having feelings of tenesmus and abdominal cramping.  Since taking laxatives he has not had formed bowel movement only watery stool.  He reports last formed bowel movement was on 10/10.  He reports over the last week or so change in bowel habits. He reports eating less. He denies any relief of symptoms with bowel movement or passing gas. Plan: Observation for intractable abd pain, asthma: initiate Protonix, Bentyl, Carafate, antiemetics prn, clear liquid diet, pain management.     10/13:    Internal medicine: Patient reports that he has 1 semisolid bowel movement and 1 liquid bowel movement overnight.  Patient still report abdominal pain to require oxycodone around 5 AM. Continue Protonix, Bentyl, Carafate, clear liquid diet, pain management, GI consult.    GI consult: He most likely had an infectious gastroenteritis a few weeks ago complicated by postinfectious irritable bowel syndrome. While he initially had diarrhea he seems to have predominantly constipation now and this is likely exacerbated by his opioid analgesics. He is agreeable to stopping all opioid pain medications. We will also give him a dose of Dulcolax 10 mg and start MiraLAX twice a day. If his pain resolves as his constipation is managed, then no further evaluation is needed. If his pain persist then we we will consider further evaluation which could include endoscopy and colonoscopy. We will also treat empirically with Protonix to see if this helps his pain.     ED Treatment-Medication Administration from 10/12/2024 0120 to 10/12/2024 0908         Date/Time Order Dose Route Action     10/12/2024 0159 sodium chloride 0.9 % bolus 1,000 mL 1,000 mL Intravenous New Bag     10/12/2024 0200 ketorolac (TORADOL) injection 15 mg 15 mg Intravenous Given     10/12/2024 0228 glycerin (adult) rectal suppository 1 suppository 1 suppository Rectal Given     10/12/2024 0200 lactulose (CHRONULAC) oral solution 20 g 20 g Oral Given     10/12/2024 0215 HYDROmorphone HCl (DILAUDID) injection 0.2 mg 0.2 mg Intravenous Given     10/12/2024 0345 HYDROmorphone HCl (DILAUDID) injection 0.2 mg 0.2 mg Intravenous Given     10/12/2024 0355 iohexol  (OMNIPAQUE) 350 MG/ML injection (MULTI-DOSE) 85 mL 85 mL Intravenous Given     10/12/2024 0651 multi-electrolyte (PLASMALYTE-A/ISOLYTE-S PH 7.4) IV solution 100 mL/hr Intravenous New Bag     10/12/2024 0646 pantoprazole (PROTONIX) EC tablet 40 mg 40 mg Oral Given     10/12/2024 0646 sucralfate (CARAFATE) tablet 1 g 1 g Oral Given     10/12/2024 0645 acetaminophen (TYLENOL) tablet 975 mg 975 mg Oral Given     10/12/2024 0646 dicyclomine (BENTYL) capsule 10 mg 10 mg Oral Given     10/12/2024 0807 oxyCODONE (ROXICODONE) IR tablet 5 mg 5 mg Oral Given            Scheduled Medications:  acetaminophen, 975 mg, Oral, Q8H  albuterol, 2 puff, Inhalation, 4x Daily  dicyclomine, 10 mg, Oral, 4x Daily (AC & HS)  pantoprazole, 40 mg, Oral, Early Morning  sucralfate, 1 g, Oral, 4x Daily (AC & HS)      Continuous IV Infusions:  multi-electrolyte, 100 mL/hr, Intravenous, Continuous      PRN Meds:  aluminum-magnesium hydroxide-simethicone, 30 mL, Oral, Q4H PRN  HYDROmorphone, 0.5 mg, Intravenous, Q4H PRN  oxyCODONE, 5 mg, Oral, Q6H PRN      ED Triage Vitals   Temperature Pulse Respirations Blood Pressure SpO2 Pain Score   10/12/24 0415 10/12/24 0127 10/12/24 0127 10/12/24 0129 10/12/24 0127 10/12/24 0200   97.9 °F (36.6 °C) 77 22 126/77 100 % 8     Weight (last 2 days)       Date/Time Weight    10/12/24 0858 94.8 (209)            Vital Signs (last 3 days)       Date/Time Temp Pulse Resp BP MAP (mmHg) SpO2 O2 Device Patient Position - Orthostatic VS Pain    10/13/24 07:26:23 98.2 °F (36.8 °C) 60 16 105/64 78 96 % -- -- --    10/13/24 0531 -- -- -- -- -- -- -- -- 6    10/12/24 21:22:45 97.4 °F (36.3 °C) 63 -- 112/70 84 97 % -- -- --    10/12/24 2116 -- -- -- -- -- -- -- -- 3    10/12/24 1908 -- -- -- -- -- -- -- -- 3    10/12/24 1454 -- -- -- -- -- -- -- -- 4    10/12/24 1013 -- -- -- -- -- -- -- -- 2    10/12/24 0858 97.7 °F (36.5 °C) 67 18 120/79 94 100 % None (Room air) -- 2    10/12/24 0807 -- -- -- -- -- -- -- -- 7    10/12/24  0415 97.9 °F (36.6 °C) -- -- -- -- -- -- -- --    10/12/24 0350 -- 56 18 112/68 84 100 % None (Room air) Lying --    10/12/24 0345 -- -- -- -- -- -- -- -- 9    10/12/24 0215 -- -- -- -- -- -- -- -- 8    10/12/24 0200 -- -- -- -- -- -- -- -- 8    10/12/24 0129 -- -- -- 126/77 -- -- -- -- --    10/12/24 0127 -- 77 22 -- -- 100 % None (Room air) Sitting --              Pertinent Labs/Diagnostic Test Results:   Radiology:  CT abdomen pelvis with contrast   Final Interpretation by Vivek Sullivan DO (10/12 0434)      No CT evidence of acute pathology or significant change.         Workstation performed: [id number]           Cardiology:  No orders to display     GI:  No orders to display           Results from last 7 days   Lab Units 10/13/24  0434 10/12/24  0650 10/12/24  0200 10/11/24  0420   WBC Thousand/uL 4.90 5.64 7.84 8.52   HEMOGLOBIN g/dL 13.3 12.9 13.5 14.5   HEMATOCRIT % 40.0 37.4 39.0 42.7   PLATELETS Thousands/uL 213 200 265 287   TOTAL NEUT ABS Thousands/µL  --  3.05 4.24 5.33         Results from last 7 days   Lab Units 10/13/24  0434 10/12/24  0650 10/12/24  0200 10/11/24  0420   SODIUM mmol/L 139 136 136 136   POTASSIUM mmol/L 4.2 4.2 3.9 4.0   CHLORIDE mmol/L 106 107 104 101   CO2 mmol/L 27 24 20* 25   ANION GAP mmol/L 6 5 12 10   BUN mg/dL 8 12 15 20   CREATININE mg/dL 0.98 1.01 1.14 1.17   EGFR ml/min/1.73sq m 98 95 82 79   CALCIUM mg/dL 9.3 8.9 9.4 10.3*     Results from last 7 days   Lab Units 10/13/24  0434 10/12/24  0650 10/12/24  0200 10/11/24  0420   AST U/L 11* 11* 12* 14   ALT U/L 10 11 13 15   ALK PHOS U/L 38 38 42 43   TOTAL PROTEIN g/dL 6.5 6.2* 6.9 7.9   ALBUMIN g/dL 4.5 4.3 4.8 5.3*   TOTAL BILIRUBIN mg/dL 0.74 0.63 0.65 0.80         Results from last 7 days   Lab Units 10/13/24  0434 10/12/24  0650 10/12/24  0200 10/11/24  0420   GLUCOSE RANDOM mg/dL 85 114 102 106           Results from last 7 days   Lab Units 10/11/24  0420   HS TNI 0HR ng/L <2             Results from last 7 days   Lab  Units 10/12/24  0200   TSH 3RD GENERATON uIU/mL 1.427         Results from last 7 days   Lab Units 10/12/24  0200 10/11/24  0420   LACTIC ACID mmol/L 0.5 0.7           Results from last 7 days   Lab Units 10/12/24  0200 10/11/24  0420   LIPASE u/L 38 29     Results from last 7 days   Lab Units 10/12/24  0200   CRP mg/L <1.0   SED RATE mm/hour 2             Results from last 7 days   Lab Units 10/11/24  0644   CLARITY UA  Clear   COLOR UA  Light Yellow   SPEC GRAV UA  >=1.050*   PH UA  6.5   GLUCOSE UA mg/dl Negative   KETONES UA mg/dl 10 (1+)*   BLOOD UA  Negative   PROTEIN UA mg/dl Negative   NITRITE UA  Negative   BILIRUBIN UA  Negative   UROBILINOGEN UA (BE) mg/dl <2.0   LEUKOCYTES UA  Negative         Past Medical History:   Diagnosis Date    Asthma     Eczema     History of chickenpox      Present on Admission:   Asthma      Admitting Diagnosis: Urine ketones [R82.4]  Intractable abdominal pain [R10.9]  Age/Sex: 38 y.o. male    Network Utilization Review Department  ATTENTION: Please call with any questions or concerns to [phone] and carefully listen to the prompts so that you are directed to the right person. All voicemails are confidential.   For Discharge needs, contact Care Management DC Support Team at [phone] opt. 2  Send all requests for admission clinical reviews, approved or denied determinations and any other requests to dedicated fax number below belonging to the campus where the patient is receiving treatment. List of dedicated fax numbers for the Facilities:  FACILITY NAME UR FAX NUMBER   ADMISSION DENIALS (Administrative/Medical Necessity) [phone]   DISCHARGE SUPPORT TEAM (NETWORK) [phone]   PARENT CHILD HEALTH (Maternity/NICU/Pediatrics) [phone]   [facility] [phone]   [facility] [phone]   [facility] [phone]   [facility] [phone]   [facility]  [facility] [phone]   [facility] [phone]   [facility] [phone]   [facility] [phone]   [facility] [phone]   [facility] [phone]   [facility] [phone]   [facility] [phone]

## 2024-10-13 NOTE — ASSESSMENT & PLAN NOTE
Patient presents with intractable abdominal pain was seen yesterday in the ED for similar.  Reports abdominal pain has been going on for the past several weeks worsening over the past couple of days with associated nausea, vomiting, chills.  Patient denies any diarrhea, sick contacts, or recent travel.  Originally was felt to be secondary to constipation he received multiple laxatives in the ED without relief of symptoms.  Patient still having intractable abdominal pain and abdominal cramping. Last normal BM was on 10/10  10/11 CT abdomen without acute intra-abdominal pathology  10/12 repeat CT without any acute changes.  CMP, lipase WNL.  No signs of acute infectious process seen not meeting SIRS criteria.  Suspect abdominal pain is multifactorial possible abdominal cramping from recent laxative intake vs gastritis vs GERD vs gastroenteritis vs constipation   10/13 - Patient's report he has 1 semisolid  and 1 liquid stool overnight.  Patient continues to have abdominal pain and required oxycodone around 5 AM this morning.    Plan  Will initiate patient on Protonix, Bentyl, Carafate  As needed antinausea medication ordered  Limit amount of narcotics as this may contribute to worsening constipation  Clear liquid diet advance as tolerated  Multimodal pain regimen  GI consulted, rec appreciated

## 2024-10-14 ENCOUNTER — APPOINTMENT (OUTPATIENT)
Dept: RADIOLOGY | Facility: HOSPITAL | Age: 39
End: 2024-10-14
Payer: COMMERCIAL

## 2024-10-14 VITALS
TEMPERATURE: 98.2 F | RESPIRATION RATE: 18 BRPM | HEIGHT: 72 IN | HEART RATE: 62 BPM | DIASTOLIC BLOOD PRESSURE: 78 MMHG | WEIGHT: 209 LBS | SYSTOLIC BLOOD PRESSURE: 118 MMHG | BODY MASS INDEX: 28.31 KG/M2 | OXYGEN SATURATION: 97 %

## 2024-10-14 PROBLEM — K59.09 OTHER CONSTIPATION: Status: ACTIVE | Noted: 2024-10-14

## 2024-10-14 LAB
ANION GAP SERPL CALCULATED.3IONS-SCNC: 8 MMOL/L (ref 4–13)
BASOPHILS # BLD AUTO: 0.04 THOUSANDS/ΜL (ref 0–0.1)
BASOPHILS NFR BLD AUTO: 1 % (ref 0–1)
BUN SERPL-MCNC: 8 MG/DL (ref 5–25)
C COLI+JEJUNI TUF STL QL NAA+PROBE: NEGATIVE
CALCIUM SERPL-MCNC: 9 MG/DL (ref 8.4–10.2)
CHLORIDE SERPL-SCNC: 105 MMOL/L (ref 96–108)
CO2 SERPL-SCNC: 26 MMOL/L (ref 21–32)
CREAT SERPL-MCNC: 1.05 MG/DL (ref 0.6–1.3)
EC STX1+STX2 GENES STL QL NAA+PROBE: NEGATIVE
EOSINOPHIL # BLD AUTO: 0.31 THOUSAND/ΜL (ref 0–0.61)
EOSINOPHIL NFR BLD AUTO: 6 % (ref 0–6)
ERYTHROCYTE [DISTWIDTH] IN BLOOD BY AUTOMATED COUNT: 11.9 % (ref 11.6–15.1)
GFR SERPL CREATININE-BSD FRML MDRD: 90 ML/MIN/1.73SQ M
GLUCOSE P FAST SERPL-MCNC: 84 MG/DL (ref 65–99)
GLUCOSE SERPL-MCNC: 84 MG/DL (ref 65–140)
HCT VFR BLD AUTO: 37.4 % (ref 36.5–49.3)
HGB BLD-MCNC: 12.8 G/DL (ref 12–17)
IMM GRANULOCYTES # BLD AUTO: 0.01 THOUSAND/UL (ref 0–0.2)
IMM GRANULOCYTES NFR BLD AUTO: 0 % (ref 0–2)
LYMPHOCYTES # BLD AUTO: 1.9 THOUSANDS/ΜL (ref 0.6–4.47)
LYMPHOCYTES NFR BLD AUTO: 39 % (ref 14–44)
MCH RBC QN AUTO: 28.5 PG (ref 26.8–34.3)
MCHC RBC AUTO-ENTMCNC: 34.2 G/DL (ref 31.4–37.4)
MCV RBC AUTO: 83 FL (ref 82–98)
MONOCYTES # BLD AUTO: 0.34 THOUSAND/ΜL (ref 0.17–1.22)
MONOCYTES NFR BLD AUTO: 7 % (ref 4–12)
NEUTROPHILS # BLD AUTO: 2.31 THOUSANDS/ΜL (ref 1.85–7.62)
NEUTS SEG NFR BLD AUTO: 47 % (ref 43–75)
NRBC BLD AUTO-RTO: 0 /100 WBCS
PLATELET # BLD AUTO: 198 THOUSANDS/UL (ref 149–390)
PMV BLD AUTO: 9.4 FL (ref 8.9–12.7)
POTASSIUM SERPL-SCNC: 3.9 MMOL/L (ref 3.5–5.3)
RBC # BLD AUTO: 4.49 MILLION/UL (ref 3.88–5.62)
SALMONELLA SP SPAO STL QL NAA+PROBE: NEGATIVE
SHIGELLA SP+EIEC IPAH STL QL NAA+PROBE: NEGATIVE
SODIUM SERPL-SCNC: 139 MMOL/L (ref 135–147)
WBC # BLD AUTO: 4.91 THOUSAND/UL (ref 4.31–10.16)

## 2024-10-14 PROCEDURE — 99213 OFFICE O/P EST LOW 20 MIN: CPT | Performed by: PHYSICIAN ASSISTANT

## 2024-10-14 PROCEDURE — 80048 BASIC METABOLIC PNL TOTAL CA: CPT

## 2024-10-14 PROCEDURE — 74018 RADEX ABDOMEN 1 VIEW: CPT

## 2024-10-14 PROCEDURE — 85025 COMPLETE CBC W/AUTO DIFF WBC: CPT

## 2024-10-14 RX ORDER — DICYCLOMINE HYDROCHLORIDE 10 MG/1
20 CAPSULE ORAL
Qty: 112 CAPSULE | Refills: 0 | Status: SHIPPED | OUTPATIENT
Start: 2024-10-14 | End: 2024-10-15 | Stop reason: ALTCHOICE

## 2024-10-14 RX ADMIN — DICYCLOMINE HYDROCHLORIDE 10 MG: 10 CAPSULE ORAL at 06:09

## 2024-10-14 RX ADMIN — ACETAMINOPHEN 975 MG: 325 TABLET ORAL at 06:09

## 2024-10-14 RX ADMIN — SUCRALFATE 1 G: 1 TABLET ORAL at 06:09

## 2024-10-14 RX ADMIN — SUCRALFATE 1 G: 1 TABLET ORAL at 12:21

## 2024-10-14 RX ADMIN — DICYCLOMINE HYDROCHLORIDE 10 MG: 10 CAPSULE ORAL at 12:21

## 2024-10-14 RX ADMIN — ACETAMINOPHEN 975 MG: 325 TABLET ORAL at 14:54

## 2024-10-14 RX ADMIN — PANTOPRAZOLE SODIUM 40 MG: 40 TABLET, DELAYED RELEASE ORAL at 06:09

## 2024-10-14 RX ADMIN — SODIUM CHLORIDE, SODIUM GLUCONATE, SODIUM ACETATE, POTASSIUM CHLORIDE, MAGNESIUM CHLORIDE, SODIUM PHOSPHATE, DIBASIC, AND POTASSIUM PHOSPHATE 100 ML/HR: .53; .5; .37; .037; .03; .012; .00082 INJECTION, SOLUTION INTRAVENOUS at 10:12

## 2024-10-14 NOTE — ASSESSMENT & PLAN NOTE
Patient presents with intractable abdominal pain was seen yesterday in the ED for similar.  Reports abdominal pain has been going on for the past several weeks worsening over the past couple of days with associated nausea, vomiting, chills.  Patient denies any diarrhea, sick contacts, or recent travel.  Originally was felt to be secondary to constipation he received multiple laxatives in the ED without relief of symptoms.  Patient still having intractable abdominal pain and abdominal cramping. Last normal BM was on 10/10  10/11 CT abdomen without acute intra-abdominal pathology  10/12 repeat CT without any acute changes.  CMP, lipase WNL.  No signs of acute infectious process seen not meeting SIRS criteria.  Suspect abdominal pain is multifactorial possible abdominal cramping from recent laxative intake vs gastritis vs GERD vs gastroenteritis vs constipation   10/13 - Patient's report he has 1 semisolid  and 1 liquid stool overnight.  Patient continues to have abdominal pain and required oxycodone around 5 AM this morning.    Plan  Patient clear for discharge home  Continue with Bentyl; dosage increased to 20mg q4 per discussion with GI and lack of improvement per patient.  Patient to take over the counter analgesic such as NSAID in addition.  GI referral made; patient to be contacted with appointment  Patient to follow up with primary care within 1 week

## 2024-10-14 NOTE — PROGRESS NOTES
"Progress Note - Gastroenterology   Name: Neo Vides 38 y.o. male I MRN: [id number]  Unit/Bed#: S -01 I Date of Admission: 10/12/2024   Date of Service: 10/14/2024 I Hospital Day: 0    Assessment & Plan  Intractable abdominal pain  -Possibly second to infectious gastroenteritis with postinfectious irritable bowel\  -Continue dicyclomine 10 mg 4 times daily  Other constipation  -May be exacerbated by opioid analgesics  -Avoid opioids  -X-ray today was normal with only minimal fecal burden  -Continue Colace and MiraLAX twice daily  -OK to advance diet (advanced to soft/thins)    GI will sign off.  Please call with questions.  Continue stool softeners and Bentyl at home plan to follow-up as outpatient          Subjective   He is now moving his bowels status post Dulcolax oral tablet.  He continues on Colace 100 mg twice daily and MiraLAX 17 g twice daily.   He feels well now, but is concerned he is not \"cleared out\".   He is also concerned about his pain tends to cycle and worsen in the evening.    Objective :  Temp:  [98.2 °F (36.8 °C)-98.4 °F (36.9 °C)] 98.2 °F (36.8 °C)  HR:  [62-64] 62  BP: (118-131)/(78-94) 118/78  Resp:  [16-18] 18  SpO2:  [96 %-97 %] 97 %  O2 Device: None (Room air)    Physical Exam  Constitutional:       General: He is not in acute distress.     Appearance: Normal appearance. He is not ill-appearing.   HENT:      Head: Normocephalic and atraumatic.   Eyes:      General: No scleral icterus.     Conjunctiva/sclera: Conjunctivae normal.   Cardiovascular:      Rate and Rhythm: Normal rate and regular rhythm.   Pulmonary:      Effort: Pulmonary effort is normal. No respiratory distress.      Breath sounds: Normal breath sounds.   Abdominal:      General: Bowel sounds are normal. There is no distension.      Palpations: Abdomen is soft.      Tenderness: There is no abdominal tenderness. There is no guarding.   Skin:     General: Skin is warm and dry.   Neurological:      Mental Status: He " is alert and oriented to person, place, and time.   Psychiatric:         Mood and Affect: Mood normal.         Behavior: Behavior normal.           Lab Results: I have reviewed the following results:CBC/BMP:   .     10/14/24  0455   WBC 4.91   HGB 12.8   HCT 37.4      SODIUM 139   K 3.9      CO2 26   BUN 8   CREATININE 1.05   GLUC 84          Other Study Results Review: No additional pertinent studies reviewed.        Salomon Mata PA-C  Gastroentrology

## 2024-10-14 NOTE — DISCHARGE SUMMARY
Discharge Summary - Hospitalist   Name: Neo Vides 38 y.o. male I MRN: [id number]  Unit/Bed#: S -01 I Date of Admission: 10/12/2024   Date of Service: 10/14/2024 I Hospital Day: 0     Assessment & Plan  Intractable abdominal pain  Patient presents with intractable abdominal pain was seen yesterday in the ED for similar.  Reports abdominal pain has been going on for the past several weeks worsening over the past couple of days with associated nausea, vomiting, chills.  Patient denies any diarrhea, sick contacts, or recent travel.  Originally was felt to be secondary to constipation he received multiple laxatives in the ED without relief of symptoms.  Patient still having intractable abdominal pain and abdominal cramping. Last normal BM was on 10/10  10/11 CT abdomen without acute intra-abdominal pathology  10/12 repeat CT without any acute changes.  CMP, lipase WNL.  No signs of acute infectious process seen not meeting SIRS criteria.  Suspect abdominal pain is multifactorial possible abdominal cramping from recent laxative intake vs gastritis vs GERD vs gastroenteritis vs constipation   10/13 - Patient's report he has 1 semisolid  and 1 liquid stool overnight.  Patient continues to have abdominal pain and required oxycodone around 5 AM this morning.    Plan  Patient clear for discharge home  Continue with Bentyl; dosage increased to 20mg q4 per discussion with GI and lack of improvement per patient.  Patient to take over the counter analgesic such as NSAID in addition.  GI referral made; patient to be contacted with appointment  Patient to follow up with primary care within 1 week  Asthma  Does not appear in acute exacerbation at this time  Continue as needed albuterol  Other constipation  Constipation resolved on bowel prep  Patient having loose stools likely secondary to laxative use  Bowel regimen held    PLAN  Patient to be discharged on Bentyl and over the counter pain medication.     Medical  Problems       Resolved Problems  Date Reviewed: 6/5/2023   None       Discharging Physician / Practitioner: Neo Moss MD  PCP: Sascha Abernathy MD  Admission Date:   Admission Orders (From admission, onward)       Ordered        10/12/24 0539  Place in Observation  Once                          Discharge Date: 10/14/24    Consultations During Hospital Stay:  Gastroenterology    Procedures Performed:   N/A    Significant Findings / Test Results:   Nonobstructive bowel gas pattern on X ray    Incidental Findings:   N/A     Test Results Pending at Discharge (will require follow up):   N/A     Outpatient Tests Requested:  None    Complications:  None    Reason for Admission: abdominal pain    Hospital Course:   Neo Vides is a 38 y.o. male patient who originally presented to the hospital on 10/12/2024 due to abdominal pain.  Patient initially constipated and started on bowel prep and multimodal pain regimen.  Patient constipation resolved with rebound diarrhea likely in response to bowel prep.  Patient is clear for discharge with Bentyl and over the counter pain medication such as NSAID; avoiding narcotic medications due to constipation on admission.  Patient received instruction to follow up with GI outpatient.      Please see above list of diagnoses and related plan for additional information.     Condition at Discharge: good    Discharge Day Visit / Exam:   Subjective:  Patient denies abdominal pain on examination; endorsed that the abdominal pain is cramping in sensation and on/off.  Endorsed resolution of constipation and initiation of diarrhea.  Vitals: Blood Pressure: 118/78 (10/14/24 0709)  Pulse: 62 (10/14/24 0709)  Temperature: 98.2 °F (36.8 °C) (10/14/24 0709)  Temp Source: Oral (10/14/24 0709)  Respirations: 18 (10/14/24 0709)  Height: 6' (182.9 cm) (10/12/24 0858)  Weight - Scale: 94.8 kg (209 lb) (10/12/24 0858)  SpO2: 97 % (10/14/24 0709)  Physical Exam  Vitals and nursing note reviewed.    Constitutional:       General: He is not in acute distress.     Appearance: He is well-developed.   HENT:      Head: Normocephalic and atraumatic.   Eyes:      Conjunctiva/sclera: Conjunctivae normal.   Cardiovascular:      Rate and Rhythm: Normal rate and regular rhythm.      Heart sounds: No murmur heard.  Pulmonary:      Effort: Pulmonary effort is normal. No respiratory distress.      Breath sounds: Normal breath sounds.   Abdominal:      Palpations: Abdomen is soft.      Tenderness: There is no abdominal tenderness.   Musculoskeletal:         General: No swelling.      Cervical back: Neck supple.   Skin:     General: Skin is warm and dry.      Capillary Refill: Capillary refill takes less than 2 seconds.   Neurological:      Mental Status: He is alert.   Psychiatric:         Mood and Affect: Mood normal.          Discussion with Family: Attempted to update  (wife) via phone. Unable to contact.    Discharge instructions/Information to patient and family:   See after visit summary for information provided to patient and family.      Provisions for Follow-Up Care:  See after visit summary for information related to follow-up care and any pertinent home health orders.      Mobility at time of Discharge:   Basic Mobility Inpatient Raw Score: 24  JH-HLM Goal: 8: Walk 250 feet or more  JH-HLM Achieved: 8: Walk 250 feet ot more  HLM Goal achieved. Continue to encourage appropriate mobility.     Disposition:   Home    Planned Readmission: No    Discharge Medications:  See after visit summary for reconciled discharge medications provided to patient and/or family.      Administrative Statements   Discharge Statement:  I have spent a total time of 30 minutes in caring for this patient on the day of the visit/encounter. >30 minutes of time was spent on: Impressions, Counseling / Coordination of care, Documenting in the medical record, Reviewing / ordering tests, medicine, procedures  , and Communicating with  other healthcare professionals .    **Please Note: This note may have been constructed using a voice recognition system**

## 2024-10-14 NOTE — DISCHARGE INSTR - AVS FIRST PAGE
Dear Neo Vides,     It was our pleasure to care for you here at [facility].  It is our hope that we were always able to exceed the expected standards for your care during your stay.  You were hospitalized due to abdominal pain.  You were cared for on the 4th floor by Neo Moss MD under the service of Cami Leroy MD with the [facility] who covers for your primary care physician (PCP), Sascha Abernathy MD, while you were hospitalized.  If you have any questions or concerns related to this hospitalization, you may contact us at .  For follow up as well as any medication refills, we recommend that you follow up with your primary care physician.  A registered nurse will reach out to you by phone within a few days after your discharge to answer any additional questions that you may have after going home.  However, at this time we provide for you here, the most important instructions / recommendations at discharge:     Notable Medication Adjustments -   Start taking Bentyl 20 mg up to 4 times daily  Can supplement with over the counter NSAIDs as needed  Testing Required after Discharge -   No testing required  ** Please contact your PCP to request testing orders for any of the testing recommended here **  Important follow up information -   Please follow up with your primary care provider within 2 weeks of discharge  A GI referral has been placed for you to follow up with; an appointment will be set for you  Other Instructions -   If your pain is uncontrolled on Bentyl and over the counter NSAIDs then please return to ED for further management.  Please ensure to hydrate appropriately with electrolytes while having diarrheal symptoms  Please review this entire after visit summary as additional general instructions including medication list, appointments, activity, diet, any pertinent wound care, and other additional recommendations  from your care team that may be provided for you.      Sincerely,     Neo Moss MD

## 2024-10-14 NOTE — ASSESSMENT & PLAN NOTE
-May be exacerbated by opioid analgesics  -Avoid opioids  -X-ray today was normal with only minimal fecal burden  -Continue Colace and MiraLAX twice daily  -OK to advance diet (advanced to soft/thins)

## 2024-10-14 NOTE — ASSESSMENT & PLAN NOTE
Constipation resolved on bowel prep  Patient having loose stools likely secondary to laxative use  Bowel regimen held    PLAN  Patient to be discharged on Bentyl and over the counter pain medication.

## 2024-10-14 NOTE — ASSESSMENT & PLAN NOTE
-Possibly second to infectious gastroenteritis with postinfectious irritable bowel\  -Continue dicyclomine 10 mg 4 times daily

## 2024-10-15 ENCOUNTER — OFFICE VISIT (OUTPATIENT)
Dept: GASTROENTEROLOGY | Facility: CLINIC | Age: 39
End: 2024-10-15
Payer: COMMERCIAL

## 2024-10-15 ENCOUNTER — TELEPHONE (OUTPATIENT)
Age: 39
End: 2024-10-15

## 2024-10-15 ENCOUNTER — NURSE TRIAGE (OUTPATIENT)
Age: 39
End: 2024-10-15

## 2024-10-15 ENCOUNTER — HOSPITAL ENCOUNTER (OUTPATIENT)
Facility: HOSPITAL | Age: 39
Setting detail: OBSERVATION
Discharge: HOME | End: 2024-10-17
Attending: EMERGENCY MEDICINE | Admitting: STUDENT IN AN ORGANIZED HEALTH CARE EDUCATION/TRAINING PROGRAM
Payer: COMMERCIAL

## 2024-10-15 ENCOUNTER — TRANSITIONAL CARE MANAGEMENT (OUTPATIENT)
Dept: FAMILY MEDICINE CLINIC | Facility: CLINIC | Age: 39
End: 2024-10-15

## 2024-10-15 VITALS
SYSTOLIC BLOOD PRESSURE: 132 MMHG | DIASTOLIC BLOOD PRESSURE: 84 MMHG | HEART RATE: 80 BPM | HEIGHT: 72 IN | WEIGHT: 205 LBS | BODY MASS INDEX: 27.77 KG/M2

## 2024-10-15 DIAGNOSIS — R19.4 CHANGE IN BOWEL HABITS: ICD-10-CM

## 2024-10-15 DIAGNOSIS — R10.9 INTRACTABLE ABDOMINAL PAIN: Primary | ICD-10-CM

## 2024-10-15 DIAGNOSIS — R10.84 GENERALIZED ABDOMINAL PAIN: Primary | ICD-10-CM

## 2024-10-15 DIAGNOSIS — R10.84 GENERALIZED ABDOMINAL PAIN: ICD-10-CM

## 2024-10-15 DIAGNOSIS — K29.70 GASTRITIS: ICD-10-CM

## 2024-10-15 PROBLEM — I49.1 ECTOPIC ATRIAL RHYTHM: Status: ACTIVE | Noted: 2024-10-15

## 2024-10-15 LAB
ALBUMIN SERPL BCG-MCNC: 4.5 G/DL (ref 3.5–5)
ALP SERPL-CCNC: 35 U/L (ref 34–104)
ALT SERPL W P-5'-P-CCNC: 11 U/L (ref 7–52)
ANION GAP SERPL CALCULATED.3IONS-SCNC: 7 MMOL/L (ref 4–13)
AST SERPL W P-5'-P-CCNC: 11 U/L (ref 13–39)
ATRIAL RATE: 60 BPM
BASOPHILS # BLD AUTO: 0.03 THOUSANDS/ΜL (ref 0–0.1)
BASOPHILS NFR BLD AUTO: 1 % (ref 0–1)
BILIRUB SERPL-MCNC: 0.56 MG/DL (ref 0.2–1)
BILIRUB UR QL STRIP: NEGATIVE
BUN SERPL-MCNC: 10 MG/DL (ref 5–25)
CALCIUM SERPL-MCNC: 9.2 MG/DL (ref 8.4–10.2)
CHLORIDE SERPL-SCNC: 105 MMOL/L (ref 96–108)
CLARITY UR: CLEAR
CO2 SERPL-SCNC: 26 MMOL/L (ref 21–32)
COLOR UR: COLORLESS
CREAT SERPL-MCNC: 1.09 MG/DL (ref 0.6–1.3)
CRP SERPL QL: <1 MG/L
EOSINOPHIL # BLD AUTO: 0.21 THOUSAND/ΜL (ref 0–0.61)
EOSINOPHIL NFR BLD AUTO: 4 % (ref 0–6)
ERYTHROCYTE [DISTWIDTH] IN BLOOD BY AUTOMATED COUNT: 11.9 % (ref 11.6–15.1)
ERYTHROCYTE [SEDIMENTATION RATE] IN BLOOD: <1 MM/HOUR (ref 0–14)
GFR SERPL CREATININE-BSD FRML MDRD: 86 ML/MIN/1.73SQ M
GLUCOSE SERPL-MCNC: 114 MG/DL (ref 65–140)
GLUCOSE UR STRIP-MCNC: NEGATIVE MG/DL
HCT VFR BLD AUTO: 36.5 % (ref 36.5–49.3)
HGB BLD-MCNC: 12.7 G/DL (ref 12–17)
HGB UR QL STRIP.AUTO: NEGATIVE
IMM GRANULOCYTES # BLD AUTO: 0.01 THOUSAND/UL (ref 0–0.2)
IMM GRANULOCYTES NFR BLD AUTO: 0 % (ref 0–2)
KETONES UR STRIP-MCNC: NEGATIVE MG/DL
LEUKOCYTE ESTERASE UR QL STRIP: NEGATIVE
LIPASE SERPL-CCNC: 31 U/L (ref 11–82)
LYMPHOCYTES # BLD AUTO: 1.49 THOUSANDS/ΜL (ref 0.6–4.47)
LYMPHOCYTES NFR BLD AUTO: 31 % (ref 14–44)
MCH RBC QN AUTO: 28.9 PG (ref 26.8–34.3)
MCHC RBC AUTO-ENTMCNC: 34.8 G/DL (ref 31.4–37.4)
MCV RBC AUTO: 83 FL (ref 82–98)
MONOCYTES # BLD AUTO: 0.35 THOUSAND/ΜL (ref 0.17–1.22)
MONOCYTES NFR BLD AUTO: 7 % (ref 4–12)
NEUTROPHILS # BLD AUTO: 2.79 THOUSANDS/ΜL (ref 1.85–7.62)
NEUTS SEG NFR BLD AUTO: 57 % (ref 43–75)
NITRITE UR QL STRIP: NEGATIVE
NRBC BLD AUTO-RTO: 0 /100 WBCS
P AXIS: 263 DEGREES
PH UR STRIP.AUTO: 7 [PH]
PLATELET # BLD AUTO: 218 THOUSANDS/UL (ref 149–390)
PMV BLD AUTO: 9.2 FL (ref 8.9–12.7)
POTASSIUM SERPL-SCNC: 3.7 MMOL/L (ref 3.5–5.3)
PR INTERVAL: 144 MS
PROT SERPL-MCNC: 6.7 G/DL (ref 6.4–8.4)
PROT UR STRIP-MCNC: NEGATIVE MG/DL
QRS AXIS: 42 DEGREES
QRSD INTERVAL: 80 MS
QT INTERVAL: 392 MS
QTC INTERVAL: 392 MS
RBC # BLD AUTO: 4.39 MILLION/UL (ref 3.88–5.62)
SODIUM SERPL-SCNC: 138 MMOL/L (ref 135–147)
SP GR UR STRIP.AUTO: 1.01 (ref 1–1.03)
T WAVE AXIS: -8 DEGREES
UROBILINOGEN UR STRIP-ACNC: <2 MG/DL
VENTRICULAR RATE: 60 BPM
WBC # BLD AUTO: 4.88 THOUSAND/UL (ref 4.31–10.16)

## 2024-10-15 PROCEDURE — 80053 COMPREHEN METABOLIC PANEL: CPT | Performed by: PHYSICIAN ASSISTANT

## 2024-10-15 PROCEDURE — 81003 URINALYSIS AUTO W/O SCOPE: CPT | Performed by: PHYSICIAN ASSISTANT

## 2024-10-15 PROCEDURE — 99213 OFFICE O/P EST LOW 20 MIN: CPT | Performed by: PHYSICIAN ASSISTANT

## 2024-10-15 PROCEDURE — 85652 RBC SED RATE AUTOMATED: CPT | Performed by: PHYSICIAN ASSISTANT

## 2024-10-15 PROCEDURE — 93005 ELECTROCARDIOGRAM TRACING: CPT

## 2024-10-15 PROCEDURE — 36415 COLL VENOUS BLD VENIPUNCTURE: CPT | Performed by: PHYSICIAN ASSISTANT

## 2024-10-15 PROCEDURE — 86140 C-REACTIVE PROTEIN: CPT | Performed by: PHYSICIAN ASSISTANT

## 2024-10-15 PROCEDURE — 85025 COMPLETE CBC W/AUTO DIFF WBC: CPT | Performed by: PHYSICIAN ASSISTANT

## 2024-10-15 PROCEDURE — 99222 1ST HOSP IP/OBS MODERATE 55: CPT | Performed by: PHYSICIAN ASSISTANT

## 2024-10-15 PROCEDURE — 93010 ELECTROCARDIOGRAM REPORT: CPT | Performed by: INTERNAL MEDICINE

## 2024-10-15 PROCEDURE — 96360 HYDRATION IV INFUSION INIT: CPT

## 2024-10-15 PROCEDURE — 83690 ASSAY OF LIPASE: CPT | Performed by: PHYSICIAN ASSISTANT

## 2024-10-15 PROCEDURE — 99284 EMERGENCY DEPT VISIT MOD MDM: CPT

## 2024-10-15 PROCEDURE — 99285 EMERGENCY DEPT VISIT HI MDM: CPT | Performed by: PHYSICIAN ASSISTANT

## 2024-10-15 RX ORDER — OMEPRAZOLE 40 MG/1
40 CAPSULE, DELAYED RELEASE ORAL DAILY
Qty: 30 CAPSULE | Refills: 2 | Status: SHIPPED | OUTPATIENT
Start: 2024-10-15

## 2024-10-15 RX ORDER — ONDANSETRON 2 MG/ML
4 INJECTION INTRAMUSCULAR; INTRAVENOUS EVERY 6 HOURS PRN
Status: DISCONTINUED | OUTPATIENT
Start: 2024-10-15 | End: 2024-10-17 | Stop reason: HOSPADM

## 2024-10-15 RX ORDER — ALBUTEROL SULFATE 90 UG/1
2 INHALANT RESPIRATORY (INHALATION) 4 TIMES DAILY
Status: DISCONTINUED | OUTPATIENT
Start: 2024-10-15 | End: 2024-10-17

## 2024-10-15 RX ORDER — PANTOPRAZOLE SODIUM 40 MG/1
40 TABLET, DELAYED RELEASE ORAL
Status: DISCONTINUED | OUTPATIENT
Start: 2024-10-16 | End: 2024-10-17 | Stop reason: HOSPADM

## 2024-10-15 RX ORDER — ACETAMINOPHEN 325 MG/1
650 TABLET ORAL EVERY 6 HOURS PRN
Status: DISCONTINUED | OUTPATIENT
Start: 2024-10-15 | End: 2024-10-17 | Stop reason: HOSPADM

## 2024-10-15 RX ORDER — HYDROMORPHONE HCL/PF 1 MG/ML
0.5 SYRINGE (ML) INJECTION ONCE
Status: DISCONTINUED | OUTPATIENT
Start: 2024-10-15 | End: 2024-10-17 | Stop reason: HOSPADM

## 2024-10-15 RX ADMIN — SODIUM CHLORIDE 1000 ML: 0.9 INJECTION, SOLUTION INTRAVENOUS at 19:19

## 2024-10-15 NOTE — PATIENT INSTRUCTIONS
Scheduled date of EGD/colonoscopy (as of today):10.17.24  Physician performing EGD/colonoscopy:DR DICKEY  Location of EGD/colonoscopy:[facility]  Desired bowel prep reviewed with patient:MANUEL/TARIK  Instructions reviewed with patient by:JUDIE  Clearances:  N/A

## 2024-10-15 NOTE — PROGRESS NOTES
Ambulatory Visit  Name: Neo Vides      : 1985      MRN: [id number]  Encounter Provider: Shaunna Carver PA-C  Encounter Date: 10/15/2024   Encounter department: [facility] [place]    Assessment & Plan  Generalized abdominal pain  Patient hospitalized over the weekend for intractable abdominal pain, symptoms started a few weeks ago accompanied by change in bowel habits/constipation. He and his family were sick with vomiting illness prior to onset of the symptoms. Blood work including CBC, CMP, lipase, lactic acid, TSH, CRP, sed rate unremarkable. CT A/P x 2 unremarkable. Constipation was treated with laxatives and follow-up KUB showed minimal stool burden. Despite this, his pain persists.    Patient is visibly uncomfortable in the office today, concerned he is going to have worsening pain which sends him back to the emergency room. His abdominal exam is benign. I explained I cannot explain his symptoms at this point. 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 I would recommend bidirectional endoscopy to evaluate for inflammation (of note, brother has Crohn's disease), ulcer, large polyp, less likely malignancy.    I discussed informed consent with the patient. The risks/benefits/alternatives of the procedure were discussed with the patient. Risks included, but not limited to, infection, bleeding, perforation, injury to organs in the abdomen, missed lesion and incomplete procedure were discussed. Patient was agreeable. Gave instructions for MiraLAX/Dulcolax prep.    Start omeprazole 40 mg daily before breakfast. I will switch dicyclomine to Levsin every 4 hours as needed for abdominal cramping. Continue Tylenol as needed. If pain becomes severe, he should go back to the emergency room.      Orders:    hyoscyamine (LEVSIN/SL) 0.125 mg SL tablet; Take 1 tablet (0.125 mg total) by mouth  every 4 (four) hours as needed for cramping    omeprazole (PriLOSEC) 40 MG capsule; Take 1 capsule (40 mg total) by mouth daily 30 minutes before breakfast. You can take a dose before dinner tonight.    EGD; Future    Change in bowel habits  See plan for abdominal pain  Orders:    EGD; Future    Colonoscopy; Future    Follow-up in 3 months    History of Present Illness     Neo Vides is a 38 y.o. male who presents for hospital follow-up of abdominal pain and change in bowel habits. He was admitted earlier this week with few weeks of abdominal pain. Patient states a few weeks ago, his children were sick with vomiting, presumed GI bug. He then developed symptoms of vomiting followed by constipation. He reports generalized abdominal pain which is cramping in nature and comes and goes. Sometimes the pain would be so severe he could not move or walk. The pain is not worse after eating. The pain was alleviated with opioid medication in the hospital and also hot showers/bath/heating pad. He went to the hospital on Thursday and had CT scan showing constipation. He was discharged with prescription for magnesium citrate, which he took and he had multiple loose bowel movements on Friday. His pain returned Friday night into Saturday. He went back to the ED and was admitted. Repeat CT scan was unremarkable. His opioids were stopped and he was treated with PPI, Tylenol, Bentyl, MiraLAX, Dulcolax. He eventually had more bowel movements and had follow-up KUB which showed minimal stool burden. He was discharged and recommended to follow-up as an outpatient. He had recurrent pain last night, not severe enough to go back to the ER, but significant pain. He has been eating clear liquid diet for the past few days. No further nausea or vomiting. His brother has Crohn's disease.    History obtained from : patient        Objective     /84   Pulse 80   Ht 6' (1.829 m)   Wt 93 kg (205 lb)   BMI 27.80 kg/m²     Physical  Exam  Vitals and nursing note reviewed.   Constitutional:       General: He is not in acute distress.     Appearance: He is well-developed.   HENT:      Head: Normocephalic and atraumatic.   Eyes:      Conjunctiva/sclera: Conjunctivae normal.   Cardiovascular:      Rate and Rhythm: Normal rate and regular rhythm.      Heart sounds: No murmur heard.  Pulmonary:      Effort: Pulmonary effort is normal. No respiratory distress.      Breath sounds: Normal breath sounds.   Abdominal:      Palpations: Abdomen is soft.      Tenderness: There is no abdominal tenderness.   Musculoskeletal:         General: No swelling.      Cervical back: Neck supple.   Skin:     General: Skin is warm and dry.      Capillary Refill: Capillary refill takes less than 2 seconds.   Neurological:      Mental Status: He is alert.   Psychiatric:         Mood and Affect: Mood normal.       Administrative Statements   I have spent a total time of 25 minutes in caring for this patient on the day of the visit/encounter including Diagnostic results, Risks and benefits of tx options, Instructions for management, Patient and family education, Importance of tx compliance, Impressions, Documenting in the medical record, Reviewing / ordering tests, medicine, procedures  , and Obtaining or reviewing history  .

## 2024-10-15 NOTE — TELEPHONE ENCOUNTER
Patient recently discharged from hospital with complaints of pain. Call transferred to nurse triage to further assist.

## 2024-10-15 NOTE — TELEPHONE ENCOUNTER
" SPOKE WITH PT, DISCHARGED FROM HOSPITAL 10/14/24, CONTINUES TO HAVE ABDOMINAL PAIN, NAUSEA, INTERMITTENT VOMITING. PT REQUESTING TO SCHEDULE F/U OFFICE VISIT. PT SCHEDULED FOR AVAILABLE APPOINTMENT TODAY.       Reason for Disposition   Information only question and nurse able to answer    Answer Assessment - Initial Assessment Questions  1. REASON FOR CALL or QUESTION: \"What is your reason for calling today?\" or \"How can I best help you?\" or \"What question do you have that I can help answer?\"      SPOKE WITH PT, DISCHARGED FROM HOSPITAL 10/14/24, CONTINUES TO HAVE ABDOMINAL PAIN, NAUSEA, INTERMITTENT VOMITING. PT REQUESTING TO SCHEDULE F/U OFFICE VISIT. PT SCHEDULED FOR AVAILABLE APPOINTMENT TODAY.    Protocols used: Information Only Call - No Triage-ADULT-[place]    "

## 2024-10-15 NOTE — ED PROVIDER NOTES
Time reflects when diagnosis was documented in both MDM as applicable and the Disposition within this note       Time User Action Codes Description Comment    10/15/2024  8:30 PM Diana Herman Add [R10.9] Intractable abdominal pain     10/15/2024  9:46 PM Jeremias Meka E Add [R10.84] Generalized abdominal pain           ED Disposition       ED Disposition   Admit    Condition   Stable    Date/Time   Tue Oct 15, 2024  8:30 PM    Comment   Case was discussed with STANLEY and the patient's admission status was agreed to be Admission Status: observation status to the service of Dr. Leroy .               Assessment & Plan       Medical Decision Making  Differential diagnosis includes but not limited to: Gastritis, colitis, enteritis, IBS, inflammatory bowel disease      Initial management discussed with patient.  States that he gets strong waves of pain that is uncontrollable or vomiting.  Discharged from hospital recently without oral pain medication.  States that he did try the medications prescribed to him earlier today by gastroenterology.  Offered IM pain medication versus repeat evaluation.  Patient states that he has concerns regarding pain control at home, and does not wish to have to return to the emergency department again.    Problems Addressed:  Intractable abdominal pain: acute illness or injury    Amount and/or Complexity of Data Reviewed  Labs: ordered. Decision-making details documented in ED Course.    Risk  Prescription drug management.  Decision regarding hospitalization.             Medications   HYDROmorphone (DILAUDID) injection 0.5 mg (0 mg Intravenous Hold 10/15/24 1920)   hyoscyamine (LEVSIN/SL) SL tablet 0.125 mg (has no administration in time range)   pantoprazole (PROTONIX) EC tablet 40 mg (40 mg Oral Given 10/16/24 0559)   albuterol (PROVENTIL HFA,VENTOLIN HFA) inhaler 2 puff (0 puffs Inhalation Hold 10/15/24 2213)   acetaminophen (TYLENOL) tablet 650 mg (has no administration in time range)    ondansetron (ZOFRAN) injection 4 mg (has no administration in time range)   sodium chloride 0.9 % bolus 1,000 mL (0 mL Intravenous Stopped 10/15/24 2024)       ED Risk Strat Scores                           SBIRT 20yo+      Flowsheet Row Most Recent Value   Initial Alcohol Screen: US AUDIT-C     1. How often do you have a drink containing alcohol? 0 Filed at: 10/15/2024 2240   2. How many drinks containing alcohol do you have on a typical day you are drinking?  0 Filed at: 10/15/2024 2240   3a. Male UNDER 65: How often do you have five or more drinks on one occasion? 0 Filed at: 10/15/2024 2240   Audit-C Score 0 Filed at: 10/15/2024 2240   JAY: How many times in the past year have you...    Used an illegal drug or used a prescription medication for non-medical reasons? Never Filed at: 10/15/2024 2240                            History of Present Illness       Chief Complaint   Patient presents with    Abdominal Pain     Lower abdominal pain. Multiple visits and admission for constipation. GI eval outpatient today scheduled for colonoscopy and endoscopy Thursday. No nausea vomiting,. No diarrhea or blood in stool. No fevers.        Past Medical History:   Diagnosis Date    Asthma     Eczema     History of chickenpox       Past Surgical History:   Procedure Laterality Date    CIRCUMCISION      WISDOM TOOTH EXTRACTION        Family History   Problem Relation Age of Onset    No Known Problems Mother     Hypertension Father     Crohn's disease Brother     No Known Problems Daughter       Social History     Tobacco Use    Smoking status: Former     Current packs/day: 0.00     Types: Cigarettes     Quit date:      Years since quittin.8    Smokeless tobacco: Never    Tobacco comments:     social smoking-couple cigarettes a week    Vaping Use    Vaping status: Every Day   Substance Use Topics    Alcohol use: Yes     Comment: socially     Drug use: Yes     Types: Marijuana      E-Cigarette/Vaping    E-Cigarette  Use Current Every Day User       E-Cigarette/Vaping Substances    Nicotine No     THC No     CBD No     Flavoring No     Other No     Unknown No       I have reviewed and agree with the history as documented.     38-year-old male presents to the emergency department with complaints of abdominal pain.  States that he has had diffuse abdominal pain and waxing and waning intensity over the past several days to weeks.  Seen in the emergency department and subsequently admitted for further evaluation and consultation with gastroenterology.  Patient was discharged from the hospital yesterday and followed up with gastroenterology in the office today.  States that his pain returned earlier this evening and that he does not have any medications to manage this at home.  Scheduled for a colonoscopy and endoscopy in 2 days.      History provided by:  Patient   used: No    Abdominal Pain  Pain location:  Generalized  Pain quality: cramping and sharp    Pain severity:  Moderate  Onset quality:  Sudden  Timing:  Constant  Progression:  Waxing and waning  Chronicity:  New  Relieved by:  Nothing  Worsened by:  Nothing  Ineffective treatments: Antispasmodics, laxatives.  Associated symptoms: no anorexia, no belching, no chest pain, no chills, no constipation, no cough, no diarrhea, no dysuria, no fatigue, no fever, no flatus, no hematemesis, no hematochezia, no hematuria, no melena, no nausea, no shortness of breath, no sore throat and no vomiting        Review of Systems   Constitutional:  Negative for activity change, appetite change, chills, fatigue and fever.   HENT:  Negative for congestion, dental problem, drooling, ear discharge, ear pain, mouth sores, nosebleeds, rhinorrhea, sore throat and trouble swallowing.    Eyes:  Negative for pain, discharge and itching.   Respiratory:  Negative for cough, chest tightness, shortness of breath and wheezing.    Cardiovascular:  Negative for chest pain and  palpitations.   Gastrointestinal:  Positive for abdominal pain. Negative for anorexia, blood in stool, constipation, diarrhea, flatus, hematemesis, hematochezia, melena, nausea and vomiting.   Endocrine: Negative for cold intolerance and heat intolerance.   Genitourinary:  Negative for difficulty urinating, dysuria, flank pain, frequency, hematuria and urgency.   Skin:  Negative for rash and wound.   Allergic/Immunologic: Negative for food allergies and immunocompromised state.   Neurological:  Negative for dizziness, seizures, syncope, weakness, numbness and headaches.   Psychiatric/Behavioral:  Negative for agitation, behavioral problems and confusion.            Objective       ED Triage Vitals [10/15/24 1749]   Temperature Pulse Blood Pressure Respirations SpO2 Patient Position - Orthostatic VS   98.1 °F (36.7 °C) 64 153/81 22 99 % Sitting      Temp Source Heart Rate Source BP Location FiO2 (%) Pain Score    Oral Monitor Right arm -- --      Vitals      Date and Time Temp Pulse SpO2 Resp BP Pain Score FACES Pain Rating User   10/16/24 0210 -- 67 97 % 18 131/77 -- -- AM   10/15/24 2024 -- 60 100 % 18 121/66 -- -- LA   10/15/24 1749 98.1 °F (36.7 °C) 64 99 % 22 153/81 -- -- EV            Physical Exam  Vitals and nursing note reviewed.   Constitutional:       General: He is in acute distress.      Appearance: He is not diaphoretic.   HENT:      Head: Normocephalic and atraumatic.      Right Ear: External ear normal.      Left Ear: External ear normal.      Mouth/Throat:      Mouth: Oropharynx is clear and moist.      Pharynx: No oropharyngeal exudate.   Eyes:      Conjunctiva/sclera: Conjunctivae normal.   Neck:      Vascular: No JVD.      Trachea: No tracheal deviation.   Cardiovascular:      Rate and Rhythm: Normal rate and regular rhythm.      Heart sounds: Normal heart sounds. No murmur heard.     No friction rub. No gallop.   Pulmonary:      Effort: Pulmonary effort is normal. No respiratory distress.       Breath sounds: Normal breath sounds. No wheezing or rales.   Chest:      Chest wall: No tenderness.   Abdominal:      General: Bowel sounds are normal. There is no distension.      Palpations: Abdomen is soft.      Tenderness: There is generalized abdominal tenderness. There is no guarding.   Musculoskeletal:         General: No tenderness, deformity or edema. Normal range of motion.   Lymphadenopathy:      Cervical: No cervical adenopathy.   Skin:     General: Skin is warm and dry.      Findings: No erythema or rash.   Neurological:      General: No focal deficit present.      Mental Status: He is alert and oriented to person, place, and time.   Psychiatric:         Mood and Affect: Mood and affect and mood normal.         Behavior: Behavior normal.         Results Reviewed       Procedure Component Value Units Date/Time    Magnesium [[id number]]  (Normal) Collected: 10/16/24 0434    Lab Status: Final result Specimen: Blood from Arm, Right Updated: 10/16/24 0511     Magnesium 1.9 mg/dL     C-reactive protein [[id number]]  (Normal) Collected: 10/15/24 1917    Lab Status: Final result Specimen: Blood from Arm, Right Updated: 10/15/24 2116     CRP <1.0 mg/L     Sedimentation rate, automated [[id number]]  (Normal) Collected: 10/15/24 1917    Lab Status: Final result Specimen: Blood from Arm, Right Updated: 10/15/24 2100     Sed Rate <1 mm/hour     UA w Reflex to Microscopic w Reflex to Culture [[id number]] Collected: 10/15/24 2000    Lab Status: Final result Specimen: Urine, Clean Catch Updated: 10/15/24 2015     Color, UA Colorless     Clarity, UA Clear     Specific Gravity, UA 1.010     pH, UA 7.0     Leukocytes, UA Negative     Nitrite, UA Negative     Protein, UA Negative mg/dl      Glucose, UA Negative mg/dl      Ketones, UA Negative mg/dl      Urobilinogen, UA <2.0 mg/dl      Bilirubin, UA Negative     Occult Blood, UA Negative    Comprehensive metabolic panel [[id number]]  (Abnormal) Collected: 10/15/24 1917    Lab  Status: Final result Specimen: Blood from Arm, Right Updated: 10/15/24 1945     Sodium 138 mmol/L      Potassium 3.7 mmol/L      Chloride 105 mmol/L      CO2 26 mmol/L      ANION GAP 7 mmol/L      BUN 10 mg/dL      Creatinine 1.09 mg/dL      Glucose 114 mg/dL      Calcium 9.2 mg/dL      AST 11 U/L      ALT 11 U/L      Alkaline Phosphatase 35 U/L      Total Protein 6.7 g/dL      Albumin 4.5 g/dL      Total Bilirubin 0.56 mg/dL      eGFR 86 ml/min/1.73sq m     Narrative:      [employer] guidelines for Chronic Kidney Disease (CKD):     Stage 1 with normal or high GFR (GFR > 90 mL/min/1.73 square meters)    Stage 2 Mild CKD (GFR = 60-89 mL/min/1.73 square meters)    Stage 3A Moderate CKD (GFR = 45-59 mL/min/1.73 square meters)    Stage 3B Moderate CKD (GFR = 30-44 mL/min/1.73 square meters)    Stage 4 Severe CKD (GFR = 15-29 mL/min/1.73 square meters)    Stage 5 End Stage CKD (GFR <15 mL/min/1.73 square meters)  Note: GFR calculation is accurate only with a steady state creatinine    Lipase [[id number]]  (Normal) Collected: 10/15/24 1917    Lab Status: Final result Specimen: Blood from Arm, Right Updated: 10/15/24 1945     Lipase 31 u/L     CBC and differential [[id number]] Collected: 10/15/24 1917    Lab Status: Final result Specimen: Blood from Arm, Right Updated: 10/15/24 1929     WBC 4.88 Thousand/uL      RBC 4.39 Million/uL      Hemoglobin 12.7 g/dL      Hematocrit 36.5 %      MCV 83 fL      MCH 28.9 pg      MCHC 34.8 g/dL      RDW 11.9 %      MPV 9.2 fL      Platelets 218 Thousands/uL      nRBC 0 /100 WBCs      Segmented % 57 %      Immature Grans % 0 %      Lymphocytes % 31 %      Monocytes % 7 %      Eosinophils Relative 4 %      Basophils Relative 1 %      Absolute Neutrophils 2.79 Thousands/µL      Absolute Immature Grans 0.01 Thousand/uL      Absolute Lymphocytes 1.49 Thousands/µL      Absolute Monocytes 0.35 Thousand/µL      Eosinophils Absolute 0.21 Thousand/µL      Basophils Absolute  0.03 Thousands/µL             No orders to display       ECG 12 Lead Documentation Only    Date/Time: 10/15/2024 8:02 PM    Performed by: Diana Herman PA-C  Authorized by: Diana Herman PA-C    Indications / Diagnosis:  Abdominal pain  ECG reviewed by me, the ED Provider: yes    Patient location:  ED  Previous ECG:     Previous ECG:  Compared to current    Comparison ECG info:  10/11/24    Similarity:  No change  Interpretation:     Interpretation: non-specific    Rate:     ECG rate:  60    ECG rate assessment: normal    Rhythm:     Rhythm: sinus rhythm    Ectopy:     Ectopy: none    QRS:     QRS axis:  Normal    QRS intervals:  Normal  Conduction:     Conduction: normal    ST segments:     ST segments:  Normal  T waves:     T waves: inverted      Inverted:  III and aVF      ED Medication and Procedure Management   Prior to Admission Medications   Prescriptions Last Dose Informant Patient Reported? Taking?   albuterol (PROVENTIL HFA,VENTOLIN HFA) 90 mcg/act inhaler   No No   Sig: Inhale 2 puffs 4 (four) times a day   hyoscyamine (LEVSIN/SL) 0.125 mg SL tablet   No No   Sig: Take 1 tablet (0.125 mg total) by mouth every 4 (four) hours as needed for cramping   omeprazole (PriLOSEC) 40 MG capsule   No No   Sig: Take 1 capsule (40 mg total) by mouth daily 30 minutes before breakfast. You can take a dose before dinner tonight.   ondansetron (ZOFRAN) 4 mg tablet   No No   Sig: Take 1 tablet (4 mg total) by mouth every 8 (eight) hours as needed for nausea or vomiting for up to 3 days   Patient not taking: Reported on 10/15/2024      Facility-Administered Medications: None     Patient's Medications   Discharge Prescriptions    No medications on file     No discharge procedures on file.  ED SEPSIS DOCUMENTATION   Time reflects when diagnosis was documented in both MDM as applicable and the Disposition within this note       Time User Action Codes Description Comment    10/15/2024  8:30 PM Diana Herman Add [R10.9]  Intractable abdominal pain     10/15/2024  9:46 PM Meka Mcdowell Add [R10.84] Generalized abdominal pain                  Diana Herman PA-C  10/16/24 0840

## 2024-10-16 PROBLEM — R19.4 CHANGE IN BOWEL HABITS: Status: ACTIVE | Noted: 2024-10-14

## 2024-10-16 PROBLEM — Z83.79 FAMILY HISTORY OF CROHN'S DISEASE: Status: RESOLVED | Noted: 2024-10-16 | Resolved: 2024-10-16

## 2024-10-16 PROBLEM — K59.04 CHRONIC IDIOPATHIC CONSTIPATION: Status: ACTIVE | Noted: 2024-10-16

## 2024-10-16 PROBLEM — Z83.79 FAMILY HISTORY OF CROHN'S DISEASE: Status: ACTIVE | Noted: 2024-10-16

## 2024-10-16 LAB
ATRIAL RATE: 62 BPM
MAGNESIUM SERPL-MCNC: 1.9 MG/DL (ref 1.9–2.7)
P AXIS: 1 DEGREES
PR INTERVAL: 132 MS
QRS AXIS: 25 DEGREES
QRSD INTERVAL: 94 MS
QT INTERVAL: 392 MS
QTC INTERVAL: 397 MS
T WAVE AXIS: -11 DEGREES
VENTRICULAR RATE: 62 BPM

## 2024-10-16 PROCEDURE — 93005 ELECTROCARDIOGRAM TRACING: CPT

## 2024-10-16 PROCEDURE — 83735 ASSAY OF MAGNESIUM: CPT | Performed by: PHYSICIAN ASSISTANT

## 2024-10-16 PROCEDURE — 99222 1ST HOSP IP/OBS MODERATE 55: CPT | Performed by: INTERNAL MEDICINE

## 2024-10-16 PROCEDURE — 93010 ELECTROCARDIOGRAM REPORT: CPT | Performed by: INTERNAL MEDICINE

## 2024-10-16 PROCEDURE — 36415 COLL VENOUS BLD VENIPUNCTURE: CPT | Performed by: PHYSICIAN ASSISTANT

## 2024-10-16 PROCEDURE — 99231 SBSQ HOSP IP/OBS SF/LOW 25: CPT | Performed by: STUDENT IN AN ORGANIZED HEALTH CARE EDUCATION/TRAINING PROGRAM

## 2024-10-16 RX ORDER — CAPSAICIN 0.025 %
CREAM (GRAM) TOPICAL ONCE
Status: DISCONTINUED | OUTPATIENT
Start: 2024-10-16 | End: 2024-10-17 | Stop reason: HOSPADM

## 2024-10-16 RX ORDER — SODIUM CHLORIDE, SODIUM LACTATE, POTASSIUM CHLORIDE, CALCIUM CHLORIDE 600; 310; 30; 20 MG/100ML; MG/100ML; MG/100ML; MG/100ML
125 INJECTION, SOLUTION INTRAVENOUS CONTINUOUS
Status: CANCELLED | OUTPATIENT
Start: 2024-10-16

## 2024-10-16 RX ADMIN — PANTOPRAZOLE SODIUM 40 MG: 40 TABLET, DELAYED RELEASE ORAL at 05:59

## 2024-10-16 NOTE — CONSULTS
Consultation - Gastroenterology   Name: Neo Vides 38 y.o. male I MRN: [id number]  Unit/Bed#: ED-33 I Date of Admission: 10/15/2024   Date of Service: 10/16/2024 I Hospital Day: 0   Inpatient consult to gastroenterology  Consult performed by: Salomon Mata PA-C  Consult ordered by: Meka Mcdowell PA-C        Physician Requesting Evaluation: Dona Napoles MD   Reason for Evaluation / Principal Problem: Generalized abdominal pain    Assessment & Plan  Generalized abdominal pain  -Avoid narcotics   -plan EGD on Thursday, October 17, 2024  Change in bowel habits  -Plan colonoscopy on Thursday, October 17, 2024   -Consider intubation of terminal ileum with a family history of Crohn's disease with his brother  -Clear liquid diet no reds today  -GoLytely preparation to start at 4 PM later today  Chronic idiopathic constipation  -Avoid narcotics  - X-ray on October 14, 2024 was normal with minimal fecal burden  -Was told to take MiraLAX and Colace at home        History of Present Illness   HPI:  Neo Vides is a 38 y.o. male with past medical history of asthma, eczema, history of chickenpox, constipation who presents for recurrent abdominal pain.  Patient was recently admitted October 11 through October 14 for the same issue.  He had originally presented with a history of diarrhea that turned to constipation.  He had CT scans done both on October 11 which showed no acute findings and then again on October 12 with no acute findings and finally an x-ray on October 14 due to concerns for constipation which was normal with minimal fecal burden.  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  Pain has been persistent since discharge.  He was seen in the office yesterday on October 15, 2024 and had EGD and colonoscopy scheduled for this Thursday however he decided to come to the emergency room instead.  Family history of Crohn's with his brother.    Symptoms currently resolved.    Endoscopic  History  EGD -none previously  Colonoscopy -none previously      Review of Systems   Constitutional:  Negative for activity change, appetite change, chills, diaphoresis, fatigue and unexpected weight change.   HENT:  Negative for mouth sores, rhinorrhea, sore throat and trouble swallowing.    Eyes:  Negative for discharge, redness and visual disturbance.   Respiratory:  Negative for apnea, cough, choking, chest tightness and shortness of breath.    Cardiovascular:  Negative for chest pain, palpitations and leg swelling.   Gastrointestinal:  Negative for abdominal distention, abdominal pain, anal bleeding, blood in stool, constipation, diarrhea, nausea, rectal pain and vomiting.   Genitourinary:  Negative for difficulty urinating, dysuria, frequency and hematuria.   Musculoskeletal:  Negative for arthralgias, back pain, gait problem, joint swelling and myalgias.   Skin:  Negative for color change, pallor and rash.   Allergic/Immunologic: Negative for environmental allergies and food allergies.   Neurological:  Negative for dizziness, tremors, weakness, light-headedness, numbness and headaches.   Psychiatric/Behavioral:  Negative for agitation, behavioral problems, confusion and sleep disturbance. The patient is not nervous/anxious.      I have reviewed the patient's PMH, PSH, Social History, Family History, Meds, and Allergies  Historical Information   Past Medical History:   Diagnosis Date    Asthma     Eczema     History of chickenpox      Past Surgical History:   Procedure Laterality Date    CIRCUMCISION      WISDOM TOOTH EXTRACTION       Social History     Tobacco Use    Smoking status: Former     Current packs/day: 0.00     Types: Cigarettes     Quit date:      Years since quittin.8    Smokeless tobacco: Never    Tobacco comments:     social smoking-couple cigarettes a week    Vaping Use    Vaping status: Every Day   Substance and Sexual Activity    Alcohol use: Yes     Comment: socially     Drug use:  Yes     Types: Marijuana    Sexual activity: Not on file     E-Cigarette/Vaping    E-Cigarette Use Current Every Day User      E-Cigarette/Vaping Substances    Nicotine No     THC No     CBD No     Flavoring No     Other No     Unknown No          Objective :  Temp:  [98.1 °F (36.7 °C)] 98.1 °F (36.7 °C)  HR:  [60-80] 67  BP: (121-153)/(66-84) 131/77  Resp:  [18-22] 18  SpO2:  [97 %-100 %] 97 %  O2 Device: None (Room air)    Physical Exam  Constitutional:       General: He is not in acute distress.     Appearance: Normal appearance. He is not ill-appearing.   HENT:      Head: Normocephalic and atraumatic.   Eyes:      General: No scleral icterus.     Conjunctiva/sclera: Conjunctivae normal.   Cardiovascular:      Rate and Rhythm: Normal rate and regular rhythm.   Pulmonary:      Effort: Pulmonary effort is normal. No respiratory distress.      Breath sounds: Normal breath sounds.   Abdominal:      General: Bowel sounds are normal. There is no distension.      Palpations: Abdomen is soft.      Tenderness: There is no abdominal tenderness. There is no guarding.   Skin:     General: Skin is warm and dry.   Neurological:      Mental Status: He is alert and oriented to person, place, and time.   Psychiatric:         Mood and Affect: Mood normal.         Behavior: Behavior normal.           Lab Results: I have reviewed the following results:CBC/BMP:   .     10/15/24  1917 10/16/24  0434   WBC 4.88  --    HGB 12.7  --    HCT 36.5  --      --    SODIUM 138  --    K 3.7  --      --    CO2 26  --    BUN 10  --    CREATININE 1.09  --    GLUC 114  --    MG  --  1.9    , LFTs:   .     10/15/24  1917   AST 11*   ALT 11   ALB 4.5   TBILI 0.56   ALKPHOS 35    , Lipase:   Lab Results   Component Value Date    LIPASE 31 10/15/2024       Imaging Results Review: I reviewed radiology reports from this admission including: CT abdomen/pelvis and xray(s).  Other Study Results Review: No additional pertinent studies  reviewed.        Salomon Mata PA-C  Gastroentrology

## 2024-10-16 NOTE — ASSESSMENT & PLAN NOTE
-Avoid narcotics  - X-ray on October 14, 2024 was normal with minimal fecal burden  -Was told to take MiraLAX and Colace at home

## 2024-10-16 NOTE — ASSESSMENT & PLAN NOTE
Presented with ongoing generalized abdominal pain. 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 Seen by GI as an outpatient today, scheduled for EGD/ colonoscopy this Thursday.  Recent imaging unremarkable, lab work unremarkable.   GI consulted.   NPO for now.   Pain control as needed. Limit narcotic use.   Continue prn Tylenol, PPI and prn Levsin.

## 2024-10-16 NOTE — H&P
H&P - Hospitalist   Name: Neo Vides 38 y.o. male I MRN: [id number]  Unit/Bed#: ED-33 I Date of Admission: 10/15/2024   Date of Service: 10/15/2024 I Hospital Day: 0     Assessment & Plan  Generalized abdominal pain  Presented with ongoing generalized abdominal pain. 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 Seen by GI as an outpatient today, scheduled for EGD/ colonoscopy this Thursday.  Recent imaging unremarkable, lab work unremarkable.   GI consulted.   NPO for now.   Pain control as needed. Limit narcotic use.   Continue prn Tylenol, PPI and prn Levsin.   Ectopic atrial rhythm  Noted on EKG; prior EKG showed sinus bradycardia.  Denies palpitations or chest pain.   Reports drinking 1 pot of coffee per day but denies any recent coffee intake over the past couple days.   Potassium 3.7.  Check mag level.   Obtain repeat EKG in AM.  Mild intermittent asthma without complication  Without acute exacerbation.   Continue prn albuterol inhaler.       VTE Pharmacologic Prophylaxis: VTE Score: 1 Low Risk (Score 0-2) - Encourage Ambulation.  Code Status: Prior   Discussion with family: Patient declined call to .     Anticipated Length of Stay: Patient will be admitted on an observation basis with an anticipated length of stay of less than 2 midnights secondary to abdominal pain.    History of Present Illness   Chief Complaint: abdominal pain    Neo Vides is a 38 y.o. male with a PMH of asthma who presents with abdominal pain. Patient reportedly had gastroenteritis with vomiting, diarrhea and abdominal pain earlier this month. After these symptoms resolved the patient began having generalized abdominal pain, constipation which has been persistent for the past week. He was admitted to the hospital from 10/12-10/14/24 for this pain at which time GI consultation was obtained, suspected postinfectious irritable bowel syndrome and  recommended bowel regimen. He reports that since being discharged yesterday his pain has been persistent. He denies vomiting, diarrhea and notes having a small, soft bowel movement since discharge. He was seen by GI as an outpatient earlier today and was scheduled for EGD and colonoscopy this Thursday. He denies fevers but admits to chills and decreased appetite. He also denies chest pain, SOB, palpitations or urinary complaints. He admits to consuming numerous cups of coffee per day but states the past couple days he has not had any coffee.     Review of Systems   Constitutional:  Positive for appetite change and chills. Negative for fever.   Respiratory:  Negative for shortness of breath and wheezing.    Cardiovascular:  Negative for chest pain.   Gastrointestinal:  Positive for abdominal pain, constipation and nausea. Negative for blood in stool and diarrhea.   Genitourinary:  Negative for difficulty urinating and dysuria.   Neurological:  Negative for dizziness.   All other systems reviewed and are negative.      Historical Information   Past Medical History:   Diagnosis Date    Asthma     Eczema     History of chickenpox      Past Surgical History:   Procedure Laterality Date    CIRCUMCISION      WISDOM TOOTH EXTRACTION       Social History     Tobacco Use    Smoking status: Former     Current packs/day: 0.00     Types: Cigarettes     Quit date:      Years since quittin.8    Smokeless tobacco: Never    Tobacco comments:     social smoking-couple cigarettes a week    Vaping Use    Vaping status: Every Day   Substance and Sexual Activity    Alcohol use: Yes     Comment: socially     Drug use: Yes     Types: Marijuana    Sexual activity: Not on file     E-Cigarette/Vaping    E-Cigarette Use Current Every Day User      E-Cigarette/Vaping Substances    Nicotine No     THC No     CBD No     Flavoring No     Other No     Unknown No      Family History   Problem Relation Age of Onset    No Known Problems  Mother     Hypertension Father     Crohn's disease Brother     No Known Problems Daughter      Social History:  Marital Status: /Civil Union   Occupation:   Patient Pre-hospital Living Situation: Home, With spouse  Patient Pre-hospital Level of Mobility: walks  Patient Pre-hospital Diet Restrictions:     Meds/Allergies   I have reviewed home medications with patient personally.  Prior to Admission medications    Medication Sig Start Date End Date Taking? Authorizing Provider   albuterol (PROVENTIL HFA,VENTOLIN HFA) 90 mcg/act inhaler Inhale 2 puffs 4 (four) times a day 6/5/23   Sascha Abernathy MD   hyoscyamine (LEVSIN/SL) 0.125 mg SL tablet Take 1 tablet (0.125 mg total) by mouth every 4 (four) hours as needed for cramping 10/15/24   Shaunna Carver PA-C   omeprazole (PriLOSEC) 40 MG capsule Take 1 capsule (40 mg total) by mouth daily 30 minutes before breakfast. You can take a dose before dinner tonight. 10/15/24   Shaunna Carver PA-C   ondansetron (ZOFRAN) 4 mg tablet Take 1 tablet (4 mg total) by mouth every 8 (eight) hours as needed for nausea or vomiting for up to 3 days  Patient not taking: Reported on 10/15/2024 10/11/24 10/14/24  Christ Villareal PA-C   dicyclomine (BENTYL) 10 mg capsule Take 2 capsules (20 mg total) by mouth 4 (four) times a day (before meals and at bedtime) for 14 days 10/14/24 10/15/24  Neo Moss MD     Allergies   Allergen Reactions    Pollen Extract Allergic Rhinitis       Objective :  Temp:  [98.1 °F (36.7 °C)] 98.1 °F (36.7 °C)  HR:  [60-80] 60  BP: (121-153)/(66-84) 121/66  Resp:  [18-22] 18  SpO2:  [99 %-100 %] 100 %  O2 Device: None (Room air)    Physical Exam  Vitals and nursing note reviewed.   Constitutional:       General: He is not in acute distress.     Appearance: He is not diaphoretic.   HENT:      Head: Normocephalic and atraumatic.      Mouth/Throat:      Mouth: Mucous membranes are moist.   Eyes:      Conjunctiva/sclera: Conjunctivae normal.    Cardiovascular:      Rate and Rhythm: Normal rate and regular rhythm.   Pulmonary:      Effort: Pulmonary effort is normal. No respiratory distress.      Breath sounds: Normal breath sounds.   Abdominal:      General: Bowel sounds are normal.      Palpations: Abdomen is soft.      Tenderness: There is generalized abdominal tenderness.   Musculoskeletal:      Right lower leg: No edema.      Left lower leg: No edema.   Skin:     General: Skin is warm and dry.      Coloration: Skin is not pale.   Neurological:      Mental Status: He is alert and oriented to person, place, and time.   Psychiatric:         Mood and Affect: Mood normal.         Lines/Drains:            Lab Results: I have reviewed the following results:  Results from last 7 days   Lab Units 10/15/24  1917   WBC Thousand/uL 4.88   HEMOGLOBIN g/dL 12.7   HEMATOCRIT % 36.5   PLATELETS Thousands/uL 218   SEGS PCT % 57   LYMPHO PCT % 31   MONO PCT % 7   EOS PCT % 4     Results from last 7 days   Lab Units 10/15/24  1917   SODIUM mmol/L 138   POTASSIUM mmol/L 3.7   CHLORIDE mmol/L 105   CO2 mmol/L 26   BUN mg/dL 10   CREATININE mg/dL 1.09   ANION GAP mmol/L 7   CALCIUM mg/dL 9.2   ALBUMIN g/dL 4.5   TOTAL BILIRUBIN mg/dL 0.56   ALK PHOS U/L 35   ALT U/L 11   AST U/L 11*   GLUCOSE RANDOM mg/dL 114             Lab Results   Component Value Date    HGBA1C 4.9 09/20/2021     Results from last 7 days   Lab Units 10/12/24  0200 10/11/24  0420   LACTIC ACID mmol/L 0.5 0.7       Imaging Results Review: No pertinent imaging studies reviewed.  Other Study Results Review: EKG was reviewed.       ** Please Note: This note has been constructed using a voice recognition system. **

## 2024-10-16 NOTE — ASSESSMENT & PLAN NOTE
Noted on EKG in ED; prior EKG showed sinus bradycardia.  Denies palpitations or chest pain.   Repeat EKG this morning with normal sinus rhythm.

## 2024-10-16 NOTE — ASSESSMENT & PLAN NOTE
Noted on EKG; prior EKG showed sinus bradycardia.  Denies palpitations or chest pain.   Reports drinking 1 pot of coffee per day but denies any recent coffee intake over the past couple days.   Potassium 3.7.  Check mag level.   Obtain repeat EKG in AM.

## 2024-10-16 NOTE — PLAN OF CARE
Problem: PAIN - ADULT  Goal: Verbalizes/displays adequate comfort level or baseline comfort level  Description: Interventions:  - Encourage patient to monitor pain and request assistance  - Assess pain using appropriate pain scale  - Administer analgesics based on type and severity of pain and evaluate response  - Implement non-pharmacological measures as appropriate and evaluate response  - Consider cultural and social influences on pain and pain management  - Notify physician/advanced practitioner if interventions unsuccessful or patient reports new pain  Outcome: Progressing     Problem: INFECTION - ADULT  Goal: Absence or prevention of progression during hospitalization  Description: INTERVENTIONS:  - Assess and monitor for signs and symptoms of infection  - Monitor lab/diagnostic results  - Monitor all insertion sites, i.e. indwelling lines, tubes, and drains  - Monitor endotracheal if appropriate and nasal secretions for changes in amount and color  - [place] appropriate cooling/warming therapies per order  - Administer medications as ordered  - Instruct and encourage patient and family to use good hand hygiene technique  - Identify and instruct in appropriate isolation precautions for identified infection/condition  Outcome: Progressing  Goal: Absence of fever/infection during neutropenic period  Description: INTERVENTIONS:  - Monitor WBC    Outcome: Progressing     Problem: SAFETY ADULT  Goal: Patient will remain free of falls  Description: INTERVENTIONS:  - Educate patient/family on patient safety including physical limitations  - Instruct patient to call for assistance with activity   - Consult OT/PT to assist with strengthening/mobility   - Keep Call bell within reach  - Keep bed low and locked with side rails adjusted as appropriate  - Keep care items and personal belongings within reach  - Initiate and maintain comfort rounds  - Make Fall Risk Sign visible to staff  - Offer Toileting every  Hours,  in advance of need  - Initiate/Maintain alarm  - Obtain necessary fall risk management equipment:   - Apply yellow socks and bracelet for high fall risk patients  - Consider moving patient to room near nurses station  Outcome: Progressing  Goal: Maintain or return to baseline ADL function  Description: INTERVENTIONS:  -  Assess patient's ability to carry out ADLs; assess patient's baseline for ADL function and identify physical deficits which impact ability to perform ADLs (bathing, care of mouth/teeth, toileting, grooming, dressing, etc.)  - Assess/evaluate cause of self-care deficits   - Assess range of motion  - Assess patient's mobility; develop plan if impaired  - Assess patient's need for assistive devices and provide as appropriate  - Encourage maximum independence but intervene and supervise when necessary  - Involve family in performance of ADLs  - Assess for home care needs following discharge   - Consider OT consult to assist with ADL evaluation and planning for discharge  - Provide patient education as appropriate  Outcome: Progressing  Goal: Maintains/Returns to pre admission functional level  Description: INTERVENTIONS:  - Perform AM-PAC 6 Click Basic Mobility/ Daily Activity assessment daily.  - Set and communicate daily mobility goal to care team and patient/family/caregiver.   - Collaborate with rehabilitation services on mobility goals if consulted  - Perform Range of Motion  times a day.  - Reposition patient every  hours.  - Dangle patient  times a day  - Stand patient  times a day  - Ambulate patient  times a day  - Out of bed to chair  times a day   - Out of bed for meals times a day  - Out of bed for toileting  - Record patient progress and toleration of activity level   Outcome: Progressing     Problem: DISCHARGE PLANNING  Goal: Discharge to home or other facility with appropriate resources  Description: INTERVENTIONS:  - Identify barriers to discharge w/patient and caregiver  - Arrange for  needed discharge resources and transportation as appropriate  - Identify discharge learning needs (meds, wound care, etc.)  - Arrange for interpretive services to assist at discharge as needed  - Refer to Case Management Department for coordinating discharge planning if the patient needs post-hospital services based on physician/advanced practitioner order or complex needs related to functional status, cognitive ability, or social support system  Outcome: Progressing

## 2024-10-16 NOTE — ASSESSMENT & PLAN NOTE
Presented with ongoing generalized abdominal pain on 10/15/2024. 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  Initially scheduled for outpatient upper and lower endoscopy with GI on Thursday, but given need for pain control in-house, will plan for inpatient endoscopy tomorrow.    Case discussed with GI and bowel prep will begin at 4 PM.  OK for clear liquid diet now, then NPO at midnight.  Pain control as needed. Limit narcotic use.   Continue prn Tylenol, PPI.   Hyoscyamine available for as needed use, but patient denies that this was beneficial to him.  Patient shared that he is a daily marijuana user.  He uses marijuana in various formulations, most recently via vape pen.  He has been reading about cannabinoid hyperemesis syndrome.  He does have cramping abdominal pain consistent with this diagnosis but denies significant emesis.  He has vomited while at home, nonbloody and nonbilious, and not but this is infrequent.  Symptoms do respond to hot showers which is consistent with [facility].  Advised patient that we will still move forward with endoscopy given family history of IBD.  However, if endoscopy is negative, it is possible that his symptoms are entirely related to cannabis use which should be discontinued.  Trial of topical capsaicin cream to the belly

## 2024-10-16 NOTE — UTILIZATION REVIEW
Initial Clinical Review    Admission: Date/Time/Statement:   Admission Orders (From admission, onward)       Ordered        10/15/24 2030  Place in Observation  Once                          Orders Placed This Encounter   Procedures    Place in Observation     Standing Status:   Standing     Number of Occurrences:   1     Order Specific Question:   Level of Care     Answer:   Med Surg [16]     ED Arrival Information       Expected   -    Arrival   10/15/2024 17:46    Acuity   Urgent              Means of arrival   Walk-In    Escorted by   Family Member    Service   Hospitalist    Admission type   Emergency              Arrival complaint   Stomach pain             Chief Complaint   Patient presents with    Abdominal Pain     Lower abdominal pain. Multiple visits and admission for constipation. GI eval outpatient today scheduled for colonoscopy and endoscopy Thursday. No nausea vomiting,. No diarrhea or blood in stool. No fevers.        Initial Presentation: 38 y.o. male with a PMH of asthma who presents with abdominal pain. Patient reportedly had gastroenteritis with vomiting, diarrhea and abdominal pain earlier this month. After these symptoms resolved the patient began having generalized abdominal pain, constipation which has been persistent for the past week. 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 He reports that since being discharged yesterday his pain has been persistent. He denies vomiting, diarrhea and notes having a small, soft bowel movement since discharge. He was seen by GI as an outpatient earlier today and was scheduled for EGD and colonoscopy this Thursday. He denies fevers but admits to chills and decreased appetite. Plan: Observation for generalized abd pain, ectopic atrial rhythm, mils asthma: GI consult, NPO, pain control, check Mg level, repeat EKG  in am.     10/16:    GI consult: plan for EGD on 10/17. Consider intubation of terminal ileum with a family history of Crohn's disease with his brother. Clear liquid diet no reds today. GoLytely preparation to start at 4 PM later today.    Internal medicine: Case discussed with GI and bowel prep will begin at 4 PM.  OK for clear liquid diet now, then NPO at midnight. Pain control. Trial of topical capsaicin cream to the belly.     ED Treatment-Medication Administration from 10/15/2024 1746 to 10/16/2024 0950         Date/Time Order Dose Route Action     10/15/2024 1919 sodium chloride 0.9 % bolus 1,000 mL 1,000 mL Intravenous New Bag     10/16/2024 0559 pantoprazole (PROTONIX) EC tablet 40 mg 40 mg Oral Given            Scheduled Medications:  albuterol, 2 puff, Inhalation, 4x Daily  HYDROmorphone, 0.5 mg, Intravenous, Once  pantoprazole, 40 mg, Oral, Early Morning  polyethylene glycol, 4,000 mL, Oral, See Admin Instructions      Continuous IV Infusions:     PRN Meds:  acetaminophen, 650 mg, Oral, Q6H PRN  hyoscyamine, 0.125 mg, Oral, Q4H PRN  ondansetron, 4 mg, Intravenous, Q6H PRN      ED Triage Vitals [10/15/24 1749]   Temperature Pulse Respirations Blood Pressure SpO2 Pain Score   98.1 °F (36.7 °C) 64 22 153/81 99 % --     Weight (last 2 days)       None            Vital Signs (last 3 days)       Date/Time Temp Pulse Resp BP MAP (mmHg) SpO2 O2 Device Patient Position - Orthostatic VS [place] Coma Scale Score    10/16/24 0210 -- 67 18 131/77 98 97 % None (Room air) Lying --    10/15/24 2244 -- -- -- -- -- -- -- -- 15    10/15/24 2024 -- 60 18 121/66 -- 100 % None (Room air) -- --    10/15/24 1749 98.1 °F (36.7 °C) 64 22 153/81 110 99 % None (Room air) Sitting --              Pertinent Labs/Diagnostic Test Results:   Radiology:  No orders to display     Cardiology:  ECG 12 lead    by Interface, Ris Results In (10/16 0735)      ECG 12 lead   Final Result by Omi Wells MD (10/15 2116)   Ectopic atrial rhythm    Abnormal ECG   When compared with ECG of 11-OCT-2024 04:59,   Ectopic atrial rhythm has replaced Sinus rhythm      Confirmed by Omi Wells ([id number]) on 10/15/2024 9:16:55 PM        GI:  No orders to display           Results from last 7 days   Lab Units 10/15/24  1917 10/14/24  0455 10/13/24  0434 10/12/24  0650 10/12/24  0200   WBC Thousand/uL 4.88 4.91 4.90 5.64 7.84   HEMOGLOBIN g/dL 12.7 12.8 13.3 12.9 13.5   HEMATOCRIT % 36.5 37.4 40.0 37.4 39.0   PLATELETS Thousands/uL 218 198 213 200 265   TOTAL NEUT ABS Thousands/µL 2.79 2.31  --  3.05 4.24         Results from last 7 days   Lab Units 10/16/24  0434 10/15/24  1917 10/14/24  0455 10/13/24  0434 10/12/24  0650 10/12/24  0200   SODIUM mmol/L  --  138 139 139 136 136   POTASSIUM mmol/L  --  3.7 3.9 4.2 4.2 3.9   CHLORIDE mmol/L  --  105 105 106 107 104   CO2 mmol/L  --  26 26 27 24 20*   ANION GAP mmol/L  --  7 8 6 5 12   BUN mg/dL  --  10 8 8 12 15   CREATININE mg/dL  --  1.09 1.05 0.98 1.01 1.14   EGFR ml/min/1.73sq m  --  86 90 98 95 82   CALCIUM mg/dL  --  9.2 9.0 9.3 8.9 9.4   MAGNESIUM mg/dL 1.9  --   --   --   --   --      Results from last 7 days   Lab Units 10/15/24  1917 10/13/24  0434 10/12/24  0650 10/12/24  0200 10/11/24  0420   AST U/L 11* 11* 11* 12* 14   ALT U/L 11 10 11 13 15   ALK PHOS U/L 35 38 38 42 43   TOTAL PROTEIN g/dL 6.7 6.5 6.2* 6.9 7.9   ALBUMIN g/dL 4.5 4.5 4.3 4.8 5.3*   TOTAL BILIRUBIN mg/dL 0.56 0.74 0.63 0.65 0.80         Results from last 7 days   Lab Units 10/15/24  1917 10/14/24  0455 10/13/24  0434 10/12/24  0650 10/12/24  0200 10/11/24  0420   GLUCOSE RANDOM mg/dL 114 84 85 114 102 106           Results from last 7 days   Lab Units 10/11/24  0420   HS TNI 0HR ng/L <2             Results from last 7 days   Lab Units 10/12/24  0200   TSH 3RD GENERATON uIU/mL 1.427         Results from last 7 days   Lab Units 10/12/24  0200 10/11/24  0420   LACTIC ACID mmol/L 0.5 0.7           Results from last 7 days   Lab Units  10/15/24  1917 10/12/24  0200 10/11/24  0420   LIPASE u/L 31 38 29     Results from last 7 days   Lab Units 10/15/24  1917 10/12/24  0200   CRP mg/L <1.0 <1.0   SED RATE mm/hour <1 2             Results from last 7 days   Lab Units 10/15/24  2000 10/11/24  0644   CLARITY UA  Clear Clear   COLOR UA  Colorless Light Yellow   SPEC GRAV UA  1.010 >=1.050*   PH UA  7.0 6.5   GLUCOSE UA mg/dl Negative Negative   KETONES UA mg/dl Negative 10 (1+)*   BLOOD UA  Negative Negative   PROTEIN UA mg/dl Negative Negative   NITRITE UA  Negative Negative   BILIRUBIN UA  Negative Negative   UROBILINOGEN UA (BE) mg/dl <2.0 <2.0   LEUKOCYTES UA  Negative Negative           Results from last 7 days   Lab Units 10/13/24  1827   SALMONELLA SP PCR  Negative   SHIGELLA SP/ENTEROINVASIVE E. COLI (EIEC)  Negative   CAMPYLOBACTER SP (JEJUNI AND COLI)  Negative   SHIGA TOXIN 1/SHIGA TOXIN 2  Negative         Past Medical History:   Diagnosis Date    Asthma     Eczema     History of chickenpox      Present on Admission:   Mild intermittent asthma without complication   Generalized abdominal pain   Change in bowel habits      Admitting Diagnosis: Stomach pain [R10.9]  Age/Sex: 38 y.o. male    Network Utilization Review Department  ATTENTION: Please call with any questions or concerns to [phone] and carefully listen to the prompts so that you are directed to the right person. All voicemails are confidential.   For Discharge needs, contact Care Management DC Support Team at [phone] opt. 2  Send all requests for admission clinical reviews, approved or denied determinations and any other requests to dedicated fax number below belonging to the campus where the patient is receiving treatment. List of dedicated fax numbers for the Facilities:  FACILITY NAME UR FAX NUMBER   ADMISSION DENIALS (Administrative/Medical Necessity) [phone]   DISCHARGE SUPPORT TEAM (NETWORK) [phone]   PARENT CHILD HEALTH (Maternity/NICU/Pediatrics)  [phone]   [facility] [phone]   [facility] [phone]   Count includes the [facility] [phone]   [facility] [phone]   [facility] [phone]   [facility] [phone]   [facility] [phone]   [facility] [phone]   [facility] [phone]   [facility] [phone]   [facility] [phone]   [facility] [phone]

## 2024-10-16 NOTE — PROGRESS NOTES
Progress Note - Hospitalist   Name: Neo Vides 38 y.o. male I MRN: [id number]  Unit/Bed#: EUGENIA I Date of Admission: 10/15/2024   Date of Service: 10/16/2024 I Hospital Day: 0    Assessment & Plan  Generalized abdominal pain  Presented with ongoing generalized abdominal pain on 10/15/2024. 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  Initially scheduled for outpatient upper and lower endoscopy with GI on Thursday, but given need for pain control in-house, will plan for inpatient endoscopy tomorrow.    Case discussed with GI and bowel prep will begin at 4 PM.  OK for clear liquid diet now, then NPO at midnight.  Pain control as needed. Limit narcotic use.   Continue prn Tylenol, PPI.   Hyoscyamine available for as needed use, but patient denies that this was beneficial to him.  Patient shared that he is a daily marijuana user.  He uses marijuana in various formulations, most recently via vape pen.  He has been reading about cannabinoid hyperemesis syndrome.  He does have cramping abdominal pain consistent with this diagnosis but denies significant emesis.  He has vomited while at home, nonbloody and nonbilious, and not but this is infrequent.  Symptoms do respond to hot showers which is consistent with CHS.  Advised patient that we will still move forward with endoscopy given family history of IBD.  However, if endoscopy is negative, it is possible that his symptoms are entirely related to cannabis use which should be discontinued.  Trial of topical capsaicin cream to the belly  Family history of Crohn's disease  Patient has brother with approximate 20-year history of Crohn's disease  Ectopic atrial rhythm  Noted on EKG in ED; prior EKG showed sinus bradycardia.  Denies palpitations or chest pain.   Repeat EKG this morning with normal sinus rhythm.  Mild intermittent asthma without complication  Continue prn albuterol inhaler.   Chronic  idiopathic constipation      VTE Pharmacologic Prophylaxis: VTE Score: 1 Low Risk (Score 0-2) - Encourage Ambulation.    Mobility:   Basic Mobility Inpatient Raw Score: 24  JH-HLM Goal: 8: Walk 250 feet or more  JH-HLM Achieved: 7: Walk 25 feet or more  JH-HLM Goal achieved. Continue to encourage appropriate mobility.    Patient Centered Rounds: I performed bedside rounds with nursing staff today.   Discussions with Specialists or Other Care Team Provider: Gastroenterology    Education and Discussions with Family / Patient: Updated  (wife) at bedside.    Current Length of Stay: 0 day(s)  Current Patient Status: Observation   Certification Statement: The patient will continue to require additional inpatient hospital stay due to endoscopy tomorrow  Discharge Plan: Anticipate discharge tomorrow to home.    Code Status: Level 1 - Full Code    Subjective   Today, patient is seen in ED room with his wife at bedside.  He denies nausea and vomiting, no blood in the stool, no BMs since yesterday which was small.  He continues to report severe, cramping, diffuse abdominal pain.  The only thing that has helped him so far is Dilaudid.  He did try hyoscyamine at home 1 time but did not find this beneficial.  He denies chest pain.  No reflux.    Objective :  Temp:  [98.1 °F (36.7 °C)] 98.1 °F (36.7 °C)  HR:  [60-80] 67  BP: (121-153)/(66-84) 131/77  Resp:  [18-22] 18  SpO2:  [97 %-100 %] 97 %  O2 Device: None (Room air)    There is no height or weight on file to calculate BMI.     Input and Output Summary (last 24 hours):   No intake or output data in the 24 hours ending 10/16/24 1346    Physical Exam  Constitutional:       General: He is not in acute distress.     Appearance: Normal appearance.   HENT:      Mouth/Throat:      Mouth: Mucous membranes are moist.      Pharynx: No oropharyngeal exudate or posterior oropharyngeal erythema.   Eyes:      General: No scleral icterus.     Extraocular Movements: Extraocular  movements intact.      Pupils: Pupils are equal, round, and reactive to light.   Cardiovascular:      Rate and Rhythm: Normal rate and regular rhythm.      Heart sounds: No murmur heard.  Pulmonary:      Effort: No respiratory distress.      Breath sounds: No wheezing, rhonchi or rales.   Abdominal:      Palpations: Abdomen is soft.      Comments: Mildly distended abdomen with diffuse tenderness.  No masses appreciated.  No rebound or guarding.  No hepatomegaly.   Musculoskeletal:      Cervical back: Normal range of motion.      Right lower leg: No edema.      Left lower leg: No edema.   Lymphadenopathy:      Cervical: No cervical adenopathy.   Skin:     Findings: No bruising.      Comments: Diffuse tattoos but no telangiectasias   Neurological:      General: No focal deficit present.      Mental Status: He is alert.           Lines/Drains:              Lab Results: I have reviewed the following results:   Results from last 7 days   Lab Units 10/15/24  1917   WBC Thousand/uL 4.88   HEMOGLOBIN g/dL 12.7   HEMATOCRIT % 36.5   PLATELETS Thousands/uL 218   SEGS PCT % 57   LYMPHO PCT % 31   MONO PCT % 7   EOS PCT % 4     Results from last 7 days   Lab Units 10/15/24  1917   SODIUM mmol/L 138   POTASSIUM mmol/L 3.7   CHLORIDE mmol/L 105   CO2 mmol/L 26   BUN mg/dL 10   CREATININE mg/dL 1.09   ANION GAP mmol/L 7   CALCIUM mg/dL 9.2   ALBUMIN g/dL 4.5   TOTAL BILIRUBIN mg/dL 0.56   ALK PHOS U/L 35   ALT U/L 11   AST U/L 11*   GLUCOSE RANDOM mg/dL 114                 Results from last 7 days   Lab Units 10/12/24  0200 10/11/24  0420   LACTIC ACID mmol/L 0.5 0.7       Recent Cultures (last 7 days):               Last 24 Hours Medication List:     Current Facility-Administered Medications:     acetaminophen (TYLENOL) tablet 650 mg, Q6H PRN    albuterol (PROVENTIL HFA,VENTOLIN HFA) inhaler 2 puff, 4x Daily    HYDROmorphone (DILAUDID) injection 0.5 mg, Once    hyoscyamine (LEVSIN/SL) SL tablet 0.125 mg, Q4H PRN    ondansetron  (ZOFRAN) injection 4 mg, Q6H PRN    pantoprazole (PROTONIX) EC tablet 40 mg, Early Morning    polyethylene glycol (GOLYTELY) bowel prep 4,000 mL, See Admin Instructions    Administrative Statements   Today, Patient Was Seen By: Dona Napoles MD      **Please Note: This note may have been constructed using a voice recognition system.**

## 2024-10-16 NOTE — ASSESSMENT & PLAN NOTE
-Plan colonoscopy on Thursday, October 17, 2024   -Consider intubation of terminal ileum with a family history of Crohn's disease with his brother  -Clear liquid diet no reds today  -GoLytely preparation to start at 4 PM later today

## 2024-10-17 ENCOUNTER — ANESTHESIA EVENT (OUTPATIENT)
Dept: GASTROENTEROLOGY | Facility: HOSPITAL | Age: 39
End: 2024-10-17
Payer: COMMERCIAL

## 2024-10-17 ENCOUNTER — ANESTHESIA (OUTPATIENT)
Dept: GASTROENTEROLOGY | Facility: HOSPITAL | Age: 39
End: 2024-10-17
Payer: COMMERCIAL

## 2024-10-17 ENCOUNTER — APPOINTMENT (OUTPATIENT)
Dept: GASTROENTEROLOGY | Facility: HOSPITAL | Age: 39
End: 2024-10-17
Payer: COMMERCIAL

## 2024-10-17 VITALS
HEART RATE: 67 BPM | RESPIRATION RATE: 20 BRPM | SYSTOLIC BLOOD PRESSURE: 122 MMHG | DIASTOLIC BLOOD PRESSURE: 72 MMHG | TEMPERATURE: 97.4 F | OXYGEN SATURATION: 100 %

## 2024-10-17 PROBLEM — K63.5 BENIGN COLON POLYP: Status: ACTIVE | Noted: 2024-10-17

## 2024-10-17 PROBLEM — R19.4 CHANGE IN BOWEL HABITS: Status: RESOLVED | Noted: 2024-10-14 | Resolved: 2024-10-17

## 2024-10-17 PROBLEM — K29.70 GASTRITIS: Status: ACTIVE | Noted: 2024-10-17

## 2024-10-17 LAB
ANION GAP SERPL CALCULATED.3IONS-SCNC: 7 MMOL/L (ref 4–13)
BUN SERPL-MCNC: 6 MG/DL (ref 5–25)
CALCIUM SERPL-MCNC: 8.7 MG/DL (ref 8.4–10.2)
CHLORIDE SERPL-SCNC: 103 MMOL/L (ref 96–108)
CO2 SERPL-SCNC: 28 MMOL/L (ref 21–32)
CREAT SERPL-MCNC: 0.97 MG/DL (ref 0.6–1.3)
GFR SERPL CREATININE-BSD FRML MDRD: 100 ML/MIN/1.73SQ M
GLUCOSE P FAST SERPL-MCNC: 91 MG/DL (ref 65–99)
GLUCOSE SERPL-MCNC: 91 MG/DL (ref 65–140)
MAGNESIUM SERPL-MCNC: 1.7 MG/DL (ref 1.9–2.7)
POTASSIUM SERPL-SCNC: 3.7 MMOL/L (ref 3.5–5.3)
SODIUM SERPL-SCNC: 138 MMOL/L (ref 135–147)

## 2024-10-17 PROCEDURE — 45385 COLONOSCOPY W/LESION REMOVAL: CPT | Performed by: INTERNAL MEDICINE

## 2024-10-17 PROCEDURE — 80048 BASIC METABOLIC PNL TOTAL CA: CPT | Performed by: STUDENT IN AN ORGANIZED HEALTH CARE EDUCATION/TRAINING PROGRAM

## 2024-10-17 PROCEDURE — 88305 TISSUE EXAM BY PATHOLOGIST: CPT | Performed by: PATHOLOGY

## 2024-10-17 PROCEDURE — 43239 EGD BIOPSY SINGLE/MULTIPLE: CPT | Performed by: INTERNAL MEDICINE

## 2024-10-17 PROCEDURE — 99238 HOSP IP/OBS DSCHRG MGMT 30/<: CPT | Performed by: STUDENT IN AN ORGANIZED HEALTH CARE EDUCATION/TRAINING PROGRAM

## 2024-10-17 PROCEDURE — 45380 COLONOSCOPY AND BIOPSY: CPT | Performed by: INTERNAL MEDICINE

## 2024-10-17 PROCEDURE — 88342 IMHCHEM/IMCYTCHM 1ST ANTB: CPT | Performed by: PATHOLOGY

## 2024-10-17 PROCEDURE — 83735 ASSAY OF MAGNESIUM: CPT | Performed by: STUDENT IN AN ORGANIZED HEALTH CARE EDUCATION/TRAINING PROGRAM

## 2024-10-17 RX ORDER — PROPOFOL 10 MG/ML
INJECTION, EMULSION INTRAVENOUS AS NEEDED
Status: DISCONTINUED | OUTPATIENT
Start: 2024-10-17 | End: 2024-10-17

## 2024-10-17 RX ORDER — ACETAMINOPHEN 325 MG/1
650 TABLET ORAL EVERY 6 HOURS PRN
Start: 2024-10-17

## 2024-10-17 RX ORDER — PROPOFOL 10 MG/ML
INJECTION, EMULSION INTRAVENOUS CONTINUOUS PRN
Status: DISCONTINUED | OUTPATIENT
Start: 2024-10-17 | End: 2024-10-17

## 2024-10-17 RX ORDER — ALBUTEROL SULFATE 90 UG/1
2 INHALANT RESPIRATORY (INHALATION) EVERY 4 HOURS PRN
Status: DISCONTINUED | OUTPATIENT
Start: 2024-10-17 | End: 2024-10-17 | Stop reason: HOSPADM

## 2024-10-17 RX ORDER — CAPSAICIN 0.025 %
1 CREAM (GRAM) TOPICAL ONCE
Qty: 25 G | Refills: 0 | Status: SHIPPED | OUTPATIENT
Start: 2024-10-17 | End: 2024-10-17

## 2024-10-17 RX ORDER — SODIUM CHLORIDE, SODIUM LACTATE, POTASSIUM CHLORIDE, CALCIUM CHLORIDE 600; 310; 30; 20 MG/100ML; MG/100ML; MG/100ML; MG/100ML
125 INJECTION, SOLUTION INTRAVENOUS CONTINUOUS
Status: DISCONTINUED | OUTPATIENT
Start: 2024-10-17 | End: 2024-10-17 | Stop reason: HOSPADM

## 2024-10-17 RX ADMIN — PROPOFOL 40 MG: 10 INJECTION, EMULSION INTRAVENOUS at 13:50

## 2024-10-17 RX ADMIN — PROPOFOL 150 MG: 10 INJECTION, EMULSION INTRAVENOUS at 13:42

## 2024-10-17 RX ADMIN — PROPOFOL 40 MG: 10 INJECTION, EMULSION INTRAVENOUS at 13:43

## 2024-10-17 RX ADMIN — PROPOFOL 140 MCG/KG/MIN: 10 INJECTION, EMULSION INTRAVENOUS at 13:52

## 2024-10-17 RX ADMIN — PROPOFOL 40 MG: 10 INJECTION, EMULSION INTRAVENOUS at 13:52

## 2024-10-17 RX ADMIN — Medication 40 MG: at 14:02

## 2024-10-17 RX ADMIN — SODIUM CHLORIDE, SODIUM LACTATE, POTASSIUM CHLORIDE, AND CALCIUM CHLORIDE: .6; .31; .03; .02 INJECTION, SOLUTION INTRAVENOUS at 13:15

## 2024-10-17 RX ADMIN — PROPOFOL 40 MG: 10 INJECTION, EMULSION INTRAVENOUS at 13:46

## 2024-10-17 RX ADMIN — PROPOFOL 40 MG: 10 INJECTION, EMULSION INTRAVENOUS at 13:48

## 2024-10-17 RX ADMIN — PROPOFOL 40 MG: 10 INJECTION, EMULSION INTRAVENOUS at 13:44

## 2024-10-17 NOTE — UTILIZATION REVIEW
Continued Stay Review    Date: 10/17                           Current Patient Class: Observation  Current Level of Care: MS    HPI:38 y.o. male initially admitted on 10/15      Assessment/Plan: 10/17   Pt NPO since MN for C scope, EGD today .Pt refused topical Capsaicin cream to abdomen yesterday . Pt shared he is daily marijuana user . If endoscopy is negative, it is possible that his symptoms are entirely related to cannabis use which should be discontinued.   Medications:   Scheduled Medications:  capsaicin, , Topical, Once  HYDROmorphone, 0.5 mg, Intravenous, Once  pantoprazole, 40 mg, Oral, Early Morning  polyethylene glycol, 4,000 mL, Oral, See Admin Instructions      Continuous IV Infusions:     PRN Meds:  acetaminophen, 650 mg, Oral, Q6H PRN  albuterol, 2 puff, Inhalation, Q4H PRN  hyoscyamine, 0.125 mg, Oral, Q4H PRN  ondansetron, 4 mg, Intravenous, Q6H PRN      Discharge Plan: TBD    Vital Signs (last 3 days)       Date/Time Temp Pulse Resp BP MAP (mmHg) SpO2 O2 Device Patient Position - Orthostatic VS Patricia Coma Scale Score Pain    10/17/24 07:29:07 98 °F (36.7 °C) 81 17 121/73 89 98 % -- -- -- --    10/16/24 23:34:42 97.9 °F (36.6 °C) 62 18 124/81 95 97 % -- -- -- --    10/16/24 2300 -- -- -- -- -- -- -- -- -- No Pain    10/16/24 2100 -- -- -- -- -- -- -- -- 15 --    10/16/24 1522 -- -- -- -- -- -- None (Room air) -- 15 No Pain    10/16/24 14:07:32 97.3 °F (36.3 °C) 72 -- 123/78 93 98 % -- -- -- --    10/16/24 1352 98 °F (36.7 °C) 89 18 124/80 99 98 % None (Room air) -- -- --    10/16/24 0210 -- 67 18 131/77 98 97 % None (Room air) Lying -- --    10/15/24 2244 -- -- -- -- -- -- -- -- 15 --    10/15/24 2024 -- 60 18 121/66 -- 100 % None (Room air) -- -- --    10/15/24 1749 98.1 °F (36.7 °C) 64 22 153/81 110 99 % None (Room air) Sitting -- --          Weight (last 2 days)       None            Pertinent Labs/Diagnostic Results:   Radiology:  No orders to display     Cardiology:  ECG 12 lead   Final  "Result by Omi Wells MD (10/16 1537)   Normal sinus rhythm   Nonspecific T wave abnormality   Abnormal ECG   When compared with ECG of 15-OCT-2024 19:54,   Sinus rhythm has replaced Ectopic atrial rhythm   Nonspecific T wave abnormality now evident in Lateral leads   Confirmed by Omi Wells ([id number]) on 10/16/2024 3:37:35 PM        GI:  No orders to display           Results from last 7 days   Lab Units 10/15/24  1917 10/14/24  0455 10/13/24  0434 10/12/24  0650 10/12/24  0200   WBC Thousand/uL 4.88 4.91 4.90 5.64 7.84   HEMOGLOBIN g/dL 12.7 12.8 13.3 12.9 13.5   HEMATOCRIT % 36.5 37.4 40.0 37.4 39.0   PLATELETS Thousands/uL 218 198 213 200 265   TOTAL NEUT ABS Thousands/µL 2.79 2.31  --  3.05 4.24         Results from last 7 days   Lab Units 10/17/24  0502 10/16/24  0434 10/15/24  1917 10/14/24  0455 10/13/24  0434 10/12/24  0650   SODIUM mmol/L 138  --  138 139 139 136   POTASSIUM mmol/L 3.7  --  3.7 3.9 4.2 4.2   CHLORIDE mmol/L 103  --  105 105 106 107   CO2 mmol/L 28  --  26 26 27 24   ANION GAP mmol/L 7  --  7 8 6 5   BUN mg/dL 6  --  10 8 8 12   CREATININE mg/dL 0.97  --  1.09 1.05 0.98 1.01   EGFR ml/min/1.73sq m 100  --  86 90 98 95   CALCIUM mg/dL 8.7  --  9.2 9.0 9.3 8.9   MAGNESIUM mg/dL 1.7* 1.9  --   --   --   --      Results from last 7 days   Lab Units 10/15/24  1917 10/13/24  0434 10/12/24  0650 10/12/24  0200 10/11/24  0420   AST U/L 11* 11* 11* 12* 14   ALT U/L 11 10 11 13 15   ALK PHOS U/L 35 38 38 42 43   TOTAL PROTEIN g/dL 6.7 6.5 6.2* 6.9 7.9   ALBUMIN g/dL 4.5 4.5 4.3 4.8 5.3*   TOTAL BILIRUBIN mg/dL 0.56 0.74 0.63 0.65 0.80         Results from last 7 days   Lab Units 10/17/24  0502 10/15/24  1917 10/14/24  0455 10/13/24  0434 10/12/24  0650 10/12/24  0200 10/11/24  0420   GLUCOSE RANDOM mg/dL 91 114 84 85 114 102 106             No results found for: \"BETA-HYDROXYBUTYRATE\"                   Results from last 7 days   Lab Units 10/11/24  0420   HS TNI 0HR ng/L <2             Results " from last 7 days   Lab Units 10/12/24  0200   TSH 3RD GENERATON uIU/mL 1.427         Results from last 7 days   Lab Units 10/12/24  0200 10/11/24  0420   LACTIC ACID mmol/L 0.5 0.7                                 Results from last 7 days   Lab Units 10/15/24  1917 10/12/24  0200 10/11/24  0420   LIPASE u/L 31 38 29     Results from last 7 days   Lab Units 10/15/24  1917 10/12/24  0200   CRP mg/L <1.0 <1.0   SED RATE mm/hour <1 2             Results from last 7 days   Lab Units 10/15/24  2000 10/11/24  0644   CLARITY UA  Clear Clear   COLOR UA  Colorless Light Yellow   SPEC GRAV UA  1.010 >=1.050*   PH UA  7.0 6.5   GLUCOSE UA mg/dl Negative Negative   KETONES UA mg/dl Negative 10 (1+)*   BLOOD UA  Negative Negative   PROTEIN UA mg/dl Negative Negative   NITRITE UA  Negative Negative   BILIRUBIN UA  Negative Negative   UROBILINOGEN UA (BE) mg/dl <2.0 <2.0   LEUKOCYTES UA  Negative Negative                         Results from last 7 days   Lab Units 10/13/24  1827   SALMONELLA SP PCR  Negative   SHIGELLA SP/ENTEROINVASIVE E. COLI (EIEC)  Negative   CAMPYLOBACTER SP (JEJUNI AND COLI)  Negative   SHIGA TOXIN 1/SHIGA TOXIN 2  Negative                           Network Utilization Review Department  ATTENTION: Please call with any questions or concerns to [phone] and carefully listen to the prompts so that you are directed to the right person. All voicemails are confidential.   For Discharge needs, contact Care Management DC Support Team at [phone] opt. 2  Send all requests for admission clinical reviews, approved or denied determinations and any other requests to dedicated fax number below belonging to the campus where the patient is receiving treatment. List of dedicated fax numbers for the Facilities:  FACILITY NAME UR FAX NUMBER   ADMISSION DENIALS (Administrative/Medical Necessity) [phone]   DISCHARGE SUPPORT TEAM (NETWORK) [phone]   PARENT CHILD HEALTH (Maternity/NICU/Pediatrics) [phone]    [facility] [phone]   [facility] [phone]   [facility] [phone]   [facility] [phone]   [facility] [phone]   [facility] [phone]   [facility] [phone]   [facility] [phone]   [facility] [phone]   [facility] [phone]   [facility] [phone]   [facility] [phone]

## 2024-10-17 NOTE — ANESTHESIA POSTPROCEDURE EVALUATION
Post-Op Assessment Note    CV Status:  Stable    Pain management: adequate       Mental Status:  Alert and awake   Hydration Status:  Stable   PONV Controlled:  None   Airway Patency:  Patent     Post Op Vitals Reviewed: Yes    No anethesia notable event occurred.    Staff: Anesthesiologist           Last Filed PACU Vitals:  Vitals Value Taken Time   Temp 97.2 °F (36.2 °C) 10/17/24 1414   Pulse 69 10/17/24 1430   /69 10/17/24 1430   Resp 20 10/17/24 1430   SpO2 96 % 10/17/24 1430       Modified Antoinette:  Activity: 2 (10/17/2024  2:30 PM)  Respiration: 2 (10/17/2024  2:30 PM)  Circulation: 2 (10/17/2024  2:30 PM)  Consciousness: 2 (10/17/2024  2:30 PM)  Oxygen Saturation: 2 (10/17/2024  2:30 PM)  Modified Antoinette Score: 10 (10/17/2024  2:30 PM)

## 2024-10-17 NOTE — PLAN OF CARE
Problem: PAIN - ADULT  Goal: Verbalizes/displays adequate comfort level or baseline comfort level  Description: Interventions:  - Encourage patient to monitor pain and request assistance  - Assess pain using appropriate pain scale  - Administer analgesics based on type and severity of pain and evaluate response  - Implement non-pharmacological measures as appropriate and evaluate response  - Consider cultural and social influences on pain and pain management  - Notify physician/advanced practitioner if interventions unsuccessful or patient reports new pain  Outcome: Progressing     Problem: INFECTION - ADULT  Goal: Absence or prevention of progression during hospitalization  Description: INTERVENTIONS:  - Assess and monitor for signs and symptoms of infection  - Monitor lab/diagnostic results  - Monitor all insertion sites, i.e. indwelling lines, tubes, and drains  - Monitor endotracheal if appropriate and nasal secretions for changes in amount and color  - [place] appropriate cooling/warming therapies per order  - Administer medications as ordered  - Instruct and encourage patient and family to use good hand hygiene technique  - Identify and instruct in appropriate isolation precautions for identified infection/condition  Outcome: Progressing  Goal: Absence of fever/infection during neutropenic period  Description: INTERVENTIONS:  - Monitor WBC    Outcome: Progressing     Problem: SAFETY ADULT  Goal: Patient will remain free of falls  Description: INTERVENTIONS:  - Educate patient/family on patient safety including physical limitations  - Instruct patient to call for assistance with activity   - Consult OT/PT to assist with strengthening/mobility   - Keep Call bell within reach  - Keep bed low and locked with side rails adjusted as appropriate  - Keep care items and personal belongings within reach  - Initiate and maintain comfort rounds  - Make Fall Risk Sign visible to staff  - Offer Toileting every  Hours,  in advance of need  - Initiate/Maintain alarm  - Obtain necessary fall risk management equipment:   - Apply yellow socks and bracelet for high fall risk patients  - Consider moving patient to room near nurses station  Outcome: Progressing  Goal: Maintain or return to baseline ADL function  Description: INTERVENTIONS:  -  Assess patient's ability to carry out ADLs; assess patient's baseline for ADL function and identify physical deficits which impact ability to perform ADLs (bathing, care of mouth/teeth, toileting, grooming, dressing, etc.)  - Assess/evaluate cause of self-care deficits   - Assess range of motion  - Assess patient's mobility; develop plan if impaired  - Assess patient's need for assistive devices and provide as appropriate  - Encourage maximum independence but intervene and supervise when necessary  - Involve family in performance of ADLs  - Assess for home care needs following discharge   - Consider OT consult to assist with ADL evaluation and planning for discharge  - Provide patient education as appropriate  Outcome: Progressing  Goal: Maintains/Returns to pre admission functional level  Description: INTERVENTIONS:  - Perform AM-PAC 6 Click Basic Mobility/ Daily Activity assessment daily.  - Set and communicate daily mobility goal to care team and patient/family/caregiver.   - Collaborate with rehabilitation services on mobility goals if consulted  - Perform Range of Motion  times a day.  - Reposition patient every  hours.  - Dangle patient  times a day  - Stand patient  times a day  - Ambulate patient  times a day  - Out of bed to chair  times a day   - Out of bed for meals times a day  - Out of bed for toileting  - Record patient progress and toleration of activity level   Outcome: Progressing     Problem: DISCHARGE PLANNING  Goal: Discharge to home or other facility with appropriate resources  Description: INTERVENTIONS:  - Identify barriers to discharge w/patient and caregiver  - Arrange for  needed discharge resources and transportation as appropriate  - Identify discharge learning needs (meds, wound care, etc.)  - Arrange for interpretive services to assist at discharge as needed  - Refer to Case Management Department for coordinating discharge planning if the patient needs post-hospital services based on physician/advanced practitioner order or complex needs related to functional status, cognitive ability, or social support system  Outcome: Progressing

## 2024-10-17 NOTE — ASSESSMENT & PLAN NOTE
Presented with ongoing generalized abdominal pain on 10/15/2024. 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  Initially scheduled for outpatient upper and lower endoscopy with GI on Thursday, but given need for pain control in-house, will have inpatient endoscopy 10/17/2024.  Pain control as needed. Limit narcotic use.    Hyoscyamine available for as needed use, but patient denies that this was beneficial to him.  Patient shared that he is a daily marijuana user.  He uses marijuana in various formulations, most recently via vape pen.  He has been reading about cannabinoid hyperemesis syndrome.  He does have cramping abdominal pain consistent with this diagnosis but denies significant emesis.  He has vomited while at home, nonbloody and nonbilious, and not but this is infrequent.  Symptoms do respond to hot showers which is consistent with CHS. Discharged with trial of topical capsaicin cream to the belly

## 2024-10-17 NOTE — PLAN OF CARE
Problem: PAIN - ADULT  Goal: Verbalizes/displays adequate comfort level or baseline comfort level  Description: Interventions:  - Encourage patient to monitor pain and request assistance  - Assess pain using appropriate pain scale  - Administer analgesics based on type and severity of pain and evaluate response  - Implement non-pharmacological measures as appropriate and evaluate response  - Consider cultural and social influences on pain and pain management  - Notify physician/advanced practitioner if interventions unsuccessful or patient reports new pain  Outcome: Progressing     Problem: INFECTION - ADULT  Goal: Absence or prevention of progression during hospitalization  Description: INTERVENTIONS:  - Assess and monitor for signs and symptoms of infection  - Monitor lab/diagnostic results  - Monitor all insertion sites, i.e. indwelling lines, tubes, and drains  - Monitor endotracheal if appropriate and nasal secretions for changes in amount and color  - [place] appropriate cooling/warming therapies per order  - Administer medications as ordered  - Instruct and encourage patient and family to use good hand hygiene technique  - Identify and instruct in appropriate isolation precautions for identified infection/condition  Outcome: Progressing  Goal: Absence of fever/infection during neutropenic period  Description: INTERVENTIONS:  - Monitor WBC    Outcome: Progressing     Problem: SAFETY ADULT  Goal: Patient will remain free of falls  Description: INTERVENTIONS:  - Educate patient/family on patient safety including physical limitations  - Instruct patient to call for assistance with activity   - Consult OT/PT to assist with strengthening/mobility   - Keep Call bell within reach  - Keep bed low and locked with side rails adjusted as appropriate  - Keep care items and personal belongings within reach  - Initiate and maintain comfort rounds  - Make Fall Risk Sign visible to staff  - Offer Toileting every  Hours,  in advance of need  - Initiate/Maintain alarm  - Obtain necessary fall risk management equipment:   - Apply yellow socks and bracelet for high fall risk patients  - Consider moving patient to room near nurses station  Outcome: Progressing  Goal: Maintain or return to baseline ADL function  Description: INTERVENTIONS:  -  Assess patient's ability to carry out ADLs; assess patient's baseline for ADL function and identify physical deficits which impact ability to perform ADLs (bathing, care of mouth/teeth, toileting, grooming, dressing, etc.)  - Assess/evaluate cause of self-care deficits   - Assess range of motion  - Assess patient's mobility; develop plan if impaired  - Assess patient's need for assistive devices and provide as appropriate  - Encourage maximum independence but intervene and supervise when necessary  - Involve family in performance of ADLs  - Assess for home care needs following discharge   - Consider OT consult to assist with ADL evaluation and planning for discharge  - Provide patient education as appropriate  Outcome: Progressing  Goal: Maintains/Returns to pre admission functional level  Description: INTERVENTIONS:  - Perform AM-PAC 6 Click Basic Mobility/ Daily Activity assessment daily.  - Set and communicate daily mobility goal to care team and patient/family/caregiver.   - Collaborate with rehabilitation services on mobility goals if consulted  - Perform Range of Motion  times a day.  - Reposition patient every  hours.  - Dangle patient  times a day  - Stand patient  times a day  - Ambulate patient  times a day  - Out of bed to chair  times a day   - Out of bed for meals  times a day  - Out of bed for toileting  - Record patient progress and toleration of activity level   Outcome: Progressing     Problem: DISCHARGE PLANNING  Goal: Discharge to home or other facility with appropriate resources  Description: INTERVENTIONS:  - Identify barriers to discharge w/patient and caregiver  - Arrange for  needed discharge resources and transportation as appropriate  - Identify discharge learning needs (meds, wound care, etc.)  - Arrange for interpretive services to assist at discharge as needed  - Refer to Case Management Department for coordinating discharge planning if the patient needs post-hospital services based on physician/advanced practitioner order or complex needs related to functional status, cognitive ability, or social support system  Outcome: Progressing     Problem: Knowledge Deficit  Goal: Patient/family/caregiver demonstrates understanding of disease process, treatment plan, medications, and discharge instructions  Description: Complete learning assessment and assess knowledge base.  Interventions:  - Provide teaching at level of understanding  - Provide teaching via preferred learning methods  Outcome: Progressing

## 2024-10-17 NOTE — ANESTHESIA PREPROCEDURE EVALUATION
"Procedure:  COLONOSCOPY  EGD    Relevant Problems   CARDIO   (+) Ectopic atrial rhythm      PULMONARY   (+) Asthma   (+) Mild intermittent asthma without complication      Admitted for abdominal pain.   Denies n/v in the past 24 hours.     Asthma, well controlled. Barely uses inhaler.       Lab Results   Component Value Date    WBC 4.88 10/15/2024    HGB 12.7 10/15/2024    HCT 36.5 10/15/2024    MCV 83 10/15/2024     10/15/2024     Lab Results   Component Value Date    SODIUM 138 10/17/2024    K 3.7 10/17/2024     10/17/2024    CO2 28 10/17/2024    BUN 6 10/17/2024    CREATININE 0.97 10/17/2024    GLUC 91 10/17/2024    CALCIUM 8.7 10/17/2024     No results found for: \"INR\", \"PROTIME\"  Lab Results   Component Value Date    HGBA1C 4.9 09/20/2021          Physical Exam    Airway    Mallampati score: II  TM Distance: >3 FB  Neck ROM: full     Dental   No notable dental hx     Cardiovascular      Pulmonary      Other Findings        Anesthesia Plan  ASA Score- 2     Anesthesia Type- IV sedation with anesthesia with ASA Monitors.         Additional Monitors:     Airway Plan:            Plan Factors-Exercise tolerance (METS): >4 METS.    Chart reviewed.    Patient summary reviewed.    Patient is not a current smoker.      Obstructive sleep apnea risk education given perioperatively.        Induction- intravenous.    Postoperative Plan-     Perioperative Resuscitation Plan - Level 1 - Full Code.       Informed Consent- Anesthetic plan and risks discussed with patient.  I personally reviewed this patient with the CRNA. Discussed and agreed on the Anesthesia Plan with the CRNA..        "

## 2024-10-17 NOTE — ANESTHESIA POSTPROCEDURE EVALUATION
Post-Op Assessment Note    CV Status:  Stable  Pain Score: 0    Pain management: adequate       Mental Status:  Alert and awake   Hydration Status:  Euvolemic   PONV Controlled:  Controlled   Airway Patency:  Patent     Post Op Vitals Reviewed: Yes    No anethesia notable event occurred.    Staff: Anesthesiologist, CRNA           Last Filed PACU Vitals:  Vitals Value Taken Time   Temp     Pulse     BP     Resp     SpO2         Modified [place]in[place]:  Activity: 2 (10/17/2024  2:15 PM)  Respiration: 2 (10/17/2024  2:15 PM)  Circulation: 2 (10/17/2024  2:15 PM)  Consciousness: 0 (10/17/2024  2:15 PM)  Oxygen Saturation: 2 (10/17/2024  2:15 PM)  Modified [place] Score: 8 (10/17/2024  2:15 PM)

## 2024-10-17 NOTE — DISCHARGE SUMMARY
Discharge Summary - Hospitalist   Name: Neo Vides 38 y.o. male I MRN: [id number]  Unit/Bed#: W -01 I Date of Admission: 10/15/2024   Date of Service: 10/17/2024 I Hospital Day: 0       Reason for Admission: Severe abdominal pain    Hospital Course:   Neo Vides is a 38 y.o. male patient who originally presented to the hospital on 10/15/2024 due to intractable, severe abdominal pain.  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  He was supposed to have endoscopy outpatient, but pain was too severe and not responding to OTC pain meds at home.  He was given IV Dilaudid in the ER with improvement in his symptoms.  Patient made in-house for endoscopy today with EGD and colonoscopy.  Results below.  Symptoms improved overnight and he was able to tolerate diet today.  Given patient's daily MJ use for years and improvement of symptoms with hot showers, I suspect that this could be cannabinoid hyperemesis syndrome.  Given results of EGD/colonoscopy and possible diagnosis of CHS, patient was discharged with the following instructions: Daily PPI x 30 days and follow-up with outpatient GI, follow-up with PCP and/or GI for results of biopsies from endoscopy, FODMAP diet, limit intake of caffeine/alcohol/spicy foods, and complete cessation of marijuana use.    Please see list of diagnoses below and related plan for additional information.     Condition at Discharge: good  Assessment & Plan  Generalized abdominal pain  Presented with ongoing generalized abdominal pain on 10/15/2024. 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  Initially scheduled for outpatient upper and lower endoscopy with GI on Thursday, but given need for pain control in-house, will have inpatient endoscopy 10/17/2024.  Pain control as needed. Limit narcotic use.    Hyoscyamine available for as needed use,  but patient denies that this was beneficial to him.  Patient shared that he is a daily marijuana user.  He uses marijuana in various formulations, most recently via vape pen.  He has been reading about cannabinoid hyperemesis syndrome.  He does have cramping abdominal pain consistent with this diagnosis but denies significant emesis.  He has vomited while at home, nonbloody and nonbilious, and not but this is infrequent.  Symptoms do respond to hot showers which is consistent with CHS. Discharged with trial of topical capsaicin cream to the belly  Family history of Crohn's disease  Patient has brother with approximate 20-year history of Crohn's disease  Mild intermittent asthma without complication  Continue prn albuterol inhaler.   Chronic idiopathic constipation    Benign colon polyp  -Follow-up biopsy results outpatient  Gastritis  -PPI daily     Medical Problems       Resolved Problems  Date Reviewed: 10/15/2024   None       Discharging Physician / Practitioner: Dona Napoles MD  PCP: Sascha Abernathy MD  Admission Date:   Admission Orders (From admission, onward)       Ordered        10/15/24 2030  Place in Observation  Once                          Discharge Date: 10/17/24    Consultations During Hospital Stay:  Gastroenterology    Procedures Performed:   EGD and colonoscopy on 10/17/2024    Significant Findings / Test Results:   Colonoscopy - Subcentimeter polyp in the rectum was removed with cold snare  · Performed forceps biopsies in the ascending colon and descending colon  · Small hemorrhoids  RECOMMENDATION:  Repeat colonoscopy in 5 years, due: 10/16/2029  Follow biopsy results in 2 weeks.  High-fiber diet with 25 to 30 g of fiber on daily basis.  Esophagogastroduodenoscopy  · The middle third of the esophagus and lower third of the esophagus appeared normal. Performed random biopsy to rule out eosinophilic esophagitis.  · Moderate edematous, erythematous mucosa in the body of the stomach; performed cold  forceps biopsy to rule out H. pylori  · The duodenal bulb, 1st part of the duodenum and 2nd part of the duodenum appeared normal. Performed random biopsy to rule out celiac disease.  RECOMMENDATION:  Await pathology results  Follow-up biopsy results in 2 to 3 weeks.  Recommend pantoprazole 40 mg once daily.  Avoid NSAIDs.  Avoid fatty foods, chocolates, caffeine, alcohol, and any other triggering foods. Avoid eating for at least 3 hours before going to bed.     Incidental Findings:   None    Test Results Pending at Discharge (will require follow up):   Biopsies from EGD  Biopsies from colonoscopy     Outpatient Tests Requested:  None    Complications: None    Discharge Day Visit / Exam:   Subjective:  Patient is sitting upright in bed.  He feels well today.  He completed his bowel prep and is awaiting colonoscopy/upper endoscopy.  He denies abdominal pain and has not had any since he was present in the ED yesterday.  He was able to tolerate some clear liquid diet yesterday without recurrence of symptoms.  No GI bleeding.    Vitals: Blood Pressure: 122/72 (10/17/24 1520)  Pulse: 67 (10/17/24 1520)  Temperature: (!) 97.4 °F (36.3 °C) (10/17/24 1520)  Temp Source: Temporal (10/17/24 1414)  Respirations: 20 (10/17/24 1430)  SpO2: 100 % (10/17/24 1520)  Physical Exam  Constitutional:       General: He is not in acute distress.     Appearance: Normal appearance. He is normal weight. He is not ill-appearing.   HENT:      Mouth/Throat:      Mouth: Mucous membranes are moist.      Pharynx: No oropharyngeal exudate or posterior oropharyngeal erythema.   Eyes:      General: No scleral icterus.     Extraocular Movements: Extraocular movements intact.      Pupils: Pupils are equal, round, and reactive to light.   Cardiovascular:      Rate and Rhythm: Normal rate and regular rhythm.      Heart sounds: No murmur heard.  Pulmonary:      Effort: Pulmonary effort is normal. No respiratory distress.      Breath sounds: No wheezing or  rales.   Abdominal:      General: Abdomen is flat. Bowel sounds are normal. There is no distension.      Palpations: Abdomen is soft. There is no mass.      Tenderness: There is no abdominal tenderness. There is no guarding or rebound.   Lymphadenopathy:      Cervical: No cervical adenopathy.   Skin:     Comments: Scattered tattoos, no jaundice, no rash   Neurological:      General: No focal deficit present.      Mental Status: He is alert.        Discussion with Family: Updated  (wife) at bedside.    Discharge instructions/Information to patient and family:   See after visit summary for information provided to patient and family.         Medication List        START taking these medications      acetaminophen 325 mg tablet  Commonly known as: TYLENOL  Take 2 tablets (650 mg total) by mouth every 6 (six) hours as needed for mild pain, headaches or fever     capsaicin 0.025 % cream  Commonly known as: ZOSTRIX  Apply 1 Application topically 1 (one) time for 1 dose            CONTINUE taking these medications      albuterol 90 mcg/act inhaler  Commonly known as: PROVENTIL HFA,VENTOLIN HFA  Inhale 2 puffs 4 (four) times a day     hyoscyamine 0.125 mg SL tablet  Commonly known as: LEVSIN/SL  Take 1 tablet (0.125 mg total) by mouth every 4 (four) hours as needed for cramping     omeprazole 40 MG capsule  Commonly known as: PriLOSEC  Take 1 capsule (40 mg total) by mouth daily 30 minutes before breakfast. You can take a dose before dinner tonight.     ondansetron 4 mg tablet  Commonly known as: ZOFRAN  Take 1 tablet (4 mg total) by mouth every 8 (eight) hours as needed for nausea or vomiting for up to 3 days                Provisions for Follow-Up Care:  See after visit summary for information related to follow-up care and any pertinent home health orders.      Mobility at time of Discharge:   Basic Mobility Inpatient Raw Score: 24  JH-HLM Goal: 8: Walk 250 feet or more  JH-HLM Achieved: 8: Walk 250 feet ot  more  HLM Goal achieved. Continue to encourage appropriate mobility.     Disposition:   Home    Planned Readmission: No    Discharge Medications:  See after visit summary for reconciled discharge medications provided to patient and/or family.      Administrative Statements   Discharge Statement:  I have spent a total time of 27 minutes in caring for this patient on the day of the visit/encounter. .    **Please Note: This note may have been constructed using a voice recognition system**

## 2024-10-18 ENCOUNTER — TELEPHONE (OUTPATIENT)
Age: 39
End: 2024-10-18

## 2024-10-18 ENCOUNTER — TRANSITIONAL CARE MANAGEMENT (OUTPATIENT)
Dept: FAMILY MEDICINE CLINIC | Facility: CLINIC | Age: 39
End: 2024-10-18

## 2024-10-18 NOTE — TELEPHONE ENCOUNTER
Patient calling in with several questions. I was able to answer all of his question and he is aware that we are still waiting on his biopsies to come back.

## 2024-10-22 PROCEDURE — 88342 IMHCHEM/IMCYTCHM 1ST ANTB: CPT | Performed by: PATHOLOGY

## 2024-10-22 PROCEDURE — 88305 TISSUE EXAM BY PATHOLOGIST: CPT | Performed by: PATHOLOGY

## 2024-10-25 ENCOUNTER — OFFICE VISIT (OUTPATIENT)
Dept: FAMILY MEDICINE CLINIC | Facility: CLINIC | Age: 39
End: 2024-10-25

## 2024-10-25 VITALS
HEIGHT: 72 IN | SYSTOLIC BLOOD PRESSURE: 118 MMHG | DIASTOLIC BLOOD PRESSURE: 76 MMHG | HEART RATE: 73 BPM | OXYGEN SATURATION: 98 % | RESPIRATION RATE: 16 BRPM | WEIGHT: 203 LBS | BODY MASS INDEX: 27.5 KG/M2

## 2024-10-25 DIAGNOSIS — K29.00 ACUTE GASTRITIS WITHOUT HEMORRHAGE, UNSPECIFIED GASTRITIS TYPE: ICD-10-CM

## 2024-10-25 DIAGNOSIS — Z83.79 FAMILY HISTORY OF CROHN'S DISEASE: ICD-10-CM

## 2024-10-25 DIAGNOSIS — K59.04 CHRONIC IDIOPATHIC CONSTIPATION: ICD-10-CM

## 2024-10-25 DIAGNOSIS — R10.84 GENERALIZED ABDOMINAL PAIN: Primary | ICD-10-CM

## 2024-10-27 NOTE — PROGRESS NOTES
Transition of Care Visit  Name: Neo Vides      : 1985      MRN: [id number]  Encounter Provider: Sascha Abernathy MD  Encounter Date: 10/25/2024   Encounter department: [facility]    Assessment & Plan  1. Abdominal pain.  The patient's colonoscopy, EGD, and biopsy results were all normal. His magnesium levels were found to be low, which could potentially contribute to his symptoms. The possibility of post-infectious irritable bowel syndrome was also considered. He was advised to abstain from marijuana for a month to observe any changes in his symptoms. A daily intake of magnesium oxide 500 mg was recommended, along with the continuation of his current one-a-day vitamin regimen. He was also advised to complete his course of omeprazole and to take a probiotic supplement, specifically Nature's Bounty Probiotic 10.      No orders of the defined types were placed in this encounter.     1. Generalized abdominal pain  2. Acute gastritis without hemorrhage, unspecified gastritis type  3. Family history of Crohn's disease  4. Chronic idiopathic constipation        History of Present Illness             Transitional Care Management Review:   Neo Vides is a 39 y.o. male here for TCM follow up.     During the TCM phone call patient stated:  TCM Call       Date and time call was made  10/21/2024  2:09 PM    Hospital care reviewed  Records reviewed    Patient was hospitialized at  [facility]    Date of Admission  10/15/24    Date of discharge  10/17/24    Diagnosis  Generalized abdominal pain    Disposition  Home    Were the patients medications reviewed and updated  Yes    Current Symptoms  None          TCM Call       Post hospital issues  None    Should patient be enrolled in anticoag monitoring?  No    Scheduled for follow up?  Yes    Patients specialists  Other (comment)    Other specialists names  Gastroenterology    Did you obtain your prescribed medications  Yes    Do  you need help managing your prescriptions or medications  No    Is transportation to your appointment needed  No    I have advised the patient to call PCP with any new or worsening symptoms  Becki Ruffin MA    Living Arrangements  Spouse or Significiant other    Support System  Partner    The type of support provided  Emotional; Financial; Physical    Do you have social support  Yes, as much as I need    Are you recieving any outpatient services  No    Are you recieving home care services  No    Are you using any community resources  No    Current waiver services  No    Have you fallen in the last 12 months  No    Interperter language line needed  No    Counseling  Patient          History of Present Illness  The patient presents for evaluation of abdominal pain.    He has experienced several episodes of severe, debilitating lower abdominal pain, which led to multiple emergency room visits. Initially, he believed the pain was due to constipation, as he was unable to have a bowel movement and was vomiting. He was given pain medication and laxatives, which temporarily alleviated his symptoms. However, the pain returned, accompanied by lightheadedness and tingling in his fingers and toes. He found temporary relief from hot showers or baths.    Despite multiple hospital admissions and tests, including CT scans, x-rays, EKG, blood work, and stool samples, no cause for his pain was identified. He was discharged without pain medication, which he believes is necessary to manage his symptoms. He has been using a heating pad to manage his pain at home.    He has a history of marijuana use, which he has stopped since his first hospital admission. He has been taking a daily men's vitamin and omeprazole since his discharge. He has also tried hyoscyamine and oxycodone, but found them ineffective.    He suspects his symptoms may be post-viral, as his children were ill with vomiting and fever last month. He has been following a  low FODMAP diet and has been gradually reintroducing foods to see if they trigger his symptoms. He has not had any pain for a week now and is starting to regain regular bowel movements.    SOCIAL HISTORY  He is an avid marijuana user. He smokes flower and has been doing it for probably 20 years.  Review of Systems   Constitutional:  Negative for activity change, appetite change and fever.   HENT:  Negative for congestion, nosebleeds and trouble swallowing.    Eyes:  Negative for itching.   Respiratory:  Negative for cough and chest tightness.    Cardiovascular:  Negative for chest pain and palpitations.   Gastrointestinal:  Negative for abdominal pain, constipation, diarrhea and nausea.   Endocrine: Negative for cold intolerance.   Genitourinary:  Negative for frequency.   Musculoskeletal:  Negative for gait problem and joint swelling.   Skin:  Negative for rash.   Allergic/Immunologic: Negative for immunocompromised state.   Neurological:  Negative for dizziness, tremors, seizures, syncope and headaches.   Psychiatric/Behavioral:  Negative for hallucinations and suicidal ideas.      Objective     /76   Pulse 73   Resp 16   Ht 6' (1.829 m)   Wt 92.1 kg (203 lb)   SpO2 98%   BMI 27.53 kg/m²     Physical Exam    Physical Exam  Vitals and nursing note reviewed.   Constitutional:       General: He is not in acute distress.     Appearance: He is well-developed.   HENT:      Head: Normocephalic and atraumatic.   Cardiovascular:      Rate and Rhythm: Normal rate and regular rhythm.      Heart sounds: Normal heart sounds. No murmur heard.  Pulmonary:      Effort: Pulmonary effort is normal.      Breath sounds: Normal breath sounds. No wheezing or rales.   Abdominal:      General: Bowel sounds are normal. There is no distension.      Palpations: Abdomen is soft.      Tenderness: There is no abdominal tenderness. There is no guarding or rebound.   Musculoskeletal:         General: No tenderness. Normal range of  motion.      Cervical back: Normal range of motion and neck supple.   Lymphadenopathy:      Cervical: No cervical adenopathy.   Skin:     General: Skin is warm and dry.      Capillary Refill: Capillary refill takes less than 2 seconds.      Findings: No rash.   Neurological:      Mental Status: He is alert and oriented to person, place, and time.      Cranial Nerves: No cranial nerve deficit.      Sensory: No sensory deficit.      Motor: No abnormal muscle tone.   Psychiatric:         Behavior: Behavior normal.         Thought Content: Thought content normal.         Judgment: Judgment normal.       Medications have been reviewed by provider in current encounter

## 2025-01-15 ENCOUNTER — OFFICE VISIT (OUTPATIENT)
Dept: GASTROENTEROLOGY | Facility: CLINIC | Age: 40
End: 2025-01-15
Payer: COMMERCIAL

## 2025-01-15 VITALS
DIASTOLIC BLOOD PRESSURE: 70 MMHG | HEIGHT: 72 IN | TEMPERATURE: 98.3 F | SYSTOLIC BLOOD PRESSURE: 118 MMHG | BODY MASS INDEX: 28.99 KG/M2 | WEIGHT: 214 LBS

## 2025-01-15 DIAGNOSIS — K63.5 BENIGN COLON POLYP: ICD-10-CM

## 2025-01-15 DIAGNOSIS — R10.84 GENERALIZED ABDOMINAL PAIN: Primary | ICD-10-CM

## 2025-01-15 DIAGNOSIS — K29.00 ACUTE GASTRITIS WITHOUT HEMORRHAGE, UNSPECIFIED GASTRITIS TYPE: ICD-10-CM

## 2025-01-15 PROCEDURE — 99213 OFFICE O/P EST LOW 20 MIN: CPT | Performed by: PHYSICIAN ASSISTANT

## 2025-01-15 NOTE — ASSESSMENT & PLAN NOTE
Identified on recent EGD. Patient denies frequent use of NSAIDs and gastric biopsies negative for H. pylori. I explained gastritis does not require specific treatment however if pain would return, would recommend resuming PPI. Continue to use NSAIDs only sparingly.

## 2025-01-15 NOTE — ASSESSMENT & PLAN NOTE
The cause of his generalized abdominal pain is unclear, fortunately it has resolved. May be due to postinfectious IBS given that his family had GI bug prior to onset of symptoms.  Extensive blood work, CT x 2, EGD/colonoscopy with biopsies unremarkable. I asked him to continue monitoring and if his pain would recur, to contact my office.

## 2025-01-15 NOTE — PROGRESS NOTES
Name: Neo Vides      : 1985      MRN: [id number]  Encounter Provider: Shaunna Carver PA-C  Encounter Date: 1/15/2025   Encounter department: [facility] [place]  :  Assessment & Plan  Generalized abdominal pain  The cause of his generalized abdominal pain is unclear, fortunately it has resolved. May be due to postinfectious IBS given that his family had GI bug prior to onset of symptoms.  Extensive blood work, CT x 2, EGD/colonoscopy with biopsies unremarkable. I asked him to continue monitoring and if his pain would recur, to contact my office.       Acute gastritis without hemorrhage, unspecified gastritis type  Identified on recent EGD. Patient denies frequent use of NSAIDs and gastric biopsies negative for H. pylori. I explained gastritis does not require specific treatment however if pain would return, would recommend resuming PPI. Continue to use NSAIDs only sparingly.       Benign colon polyp  Due for next colonoscopy at age 47       Follow-up as needed    History of Present Illness   HPI  Neo Vides is a 39 y.o. male who presents for follow-up for generalized abdominal pain.    History obtained from: patient    He was last seen in October due to severe generalized abdominal pain.  He spent about a week in the hospital.  Blood work including CBC, CMP, lipase, lactic acid, TSH, CRP, sed rate unremarkable.  CT A/P x 2 unremarkable.  He was treated for constipation but pain persisted.  He was then scheduled for EGD/colonoscopy which she had completed on 10/17/2024.  EGD only significant for moderate gastritis, biopsies negative for H. pylori and celiac disease and eosinophilic esophagitis.  Colonoscopy showed benign rectal polyp, otherwise normal random colon biopsies.    Patient states that after the colonoscopy, his pain completely resolved.  He completed course of a probiotic.  He is no longer on GI specific medication.  He is able to tolerate all  foods.  He denies nausea, vomiting, reflux, diarrhea, constipation, black and bloody stools.  His weight is stable.  He takes NSAIDs sparingly.     Objective   /70 (BP Location: Left arm, Patient Position: Sitting, Cuff Size: Adult)   Temp 98.3 °F (36.8 °C) (Tympanic)   Ht 6' (1.829 m)   Wt 97.1 kg (214 lb)   BMI 29.02 kg/m²      Physical Exam  Vitals and nursing note reviewed.   Constitutional:       General: He is not in acute distress.     Appearance: He is well-developed.   HENT:      Head: Normocephalic and atraumatic.   Eyes:      Conjunctiva/sclera: Conjunctivae normal.   Cardiovascular:      Rate and Rhythm: Normal rate and regular rhythm.      Heart sounds: No murmur heard.  Pulmonary:      Effort: Pulmonary effort is normal. No respiratory distress.      Breath sounds: Normal breath sounds.   Abdominal:      Palpations: Abdomen is soft.      Tenderness: There is no abdominal tenderness.   Musculoskeletal:         General: No swelling.      Cervical back: Neck supple.   Skin:     General: Skin is warm and dry.   Neurological:      Mental Status: He is alert.   Psychiatric:         Mood and Affect: Mood normal.

## 2025-03-28 ENCOUNTER — TELEPHONE (OUTPATIENT)
Dept: FAMILY MEDICINE CLINIC | Facility: CLINIC | Age: 40
End: 2025-03-28